# Patient Record
Sex: MALE | Race: WHITE | Employment: FULL TIME | URBAN - METROPOLITAN AREA
[De-identification: names, ages, dates, MRNs, and addresses within clinical notes are randomized per-mention and may not be internally consistent; named-entity substitution may affect disease eponyms.]

---

## 2022-01-04 ENCOUNTER — VIRTUAL VISIT (OUTPATIENT)
Dept: PRIMARY CARE CLINIC | Age: 62
End: 2022-01-04
Payer: COMMERCIAL

## 2022-01-04 ENCOUNTER — NURSE ONLY (OUTPATIENT)
Dept: PRIMARY CARE CLINIC | Age: 62
End: 2022-01-04

## 2022-01-04 DIAGNOSIS — Z20.822 EXPOSURE TO COVID-19 VIRUS: Primary | ICD-10-CM

## 2022-01-04 DIAGNOSIS — R05.9 COUGH: ICD-10-CM

## 2022-01-04 DIAGNOSIS — R68.89 FLU-LIKE SYMPTOMS: ICD-10-CM

## 2022-01-04 DIAGNOSIS — R09.82 POST-NASAL DRIP: ICD-10-CM

## 2022-01-04 LAB
INFLUENZA A ANTIBODY: NORMAL
INFLUENZA B ANTIBODY: NORMAL
Lab: ABNORMAL
PERFORMING INSTRUMENT: ABNORMAL
QC PASS/FAIL: ABNORMAL
SARS-COV-2, POC: DETECTED

## 2022-01-04 PROCEDURE — 87804 INFLUENZA ASSAY W/OPTIC: CPT | Performed by: NURSE PRACTITIONER

## 2022-01-04 PROCEDURE — 87426 SARSCOV CORONAVIRUS AG IA: CPT | Performed by: NURSE PRACTITIONER

## 2022-01-04 PROCEDURE — 99442 PR PHYS/QHP TELEPHONE EVALUATION 11-20 MIN: CPT | Performed by: NURSE PRACTITIONER

## 2022-01-04 RX ORDER — FLUTICASONE PROPIONATE 50 MCG
2 SPRAY, SUSPENSION (ML) NASAL DAILY
Qty: 1 EACH | Refills: 0 | Status: SHIPPED | OUTPATIENT
Start: 2022-01-04

## 2022-01-04 RX ORDER — BENZONATATE 100 MG/1
100 CAPSULE ORAL 3 TIMES DAILY PRN
Qty: 1 CAPSULE | Refills: 0 | Status: SHIPPED | OUTPATIENT
Start: 2022-01-04

## 2022-01-04 ASSESSMENT — PATIENT HEALTH QUESTIONNAIRE - PHQ9
SUM OF ALL RESPONSES TO PHQ QUESTIONS 1-9: 0
SUM OF ALL RESPONSES TO PHQ QUESTIONS 1-9: 0
SUM OF ALL RESPONSES TO PHQ9 QUESTIONS 1 & 2: 0
SUM OF ALL RESPONSES TO PHQ QUESTIONS 1-9: 0
1. LITTLE INTEREST OR PLEASURE IN DOING THINGS: 0
SUM OF ALL RESPONSES TO PHQ QUESTIONS 1-9: 0
2. FEELING DOWN, DEPRESSED OR HOPELESS: 0

## 2022-01-04 ASSESSMENT — ENCOUNTER SYMPTOMS
ABDOMINAL PAIN: 0
HOARSE VOICE: 0
SINUS PAIN: 0
DIARRHEA: 0
EYE REDNESS: 0
EYE ITCHING: 0
COUGH: 1
ABDOMINAL DISTENTION: 0
APNEA: 0
NAUSEA: 0
SINUS COMPLAINT: 1
WHEEZING: 0
TROUBLE SWALLOWING: 0
RHINORRHEA: 1
CHEST TIGHTNESS: 0
VOMITING: 0
CONSTIPATION: 0
SHORTNESS OF BREATH: 0
SORE THROAT: 0

## 2022-01-04 NOTE — PROGRESS NOTES
sickness and no recent travel    Generalized Body Aches  This is a new problem. The current episode started in the past 7 days (x coupole). The problem occurs daily. The problem has been unchanged. Associated symptoms include coughing (per pt dry and occasional), fatigue and a fever (pt reports low grade of 99f today). Pertinent negatives include no abdominal pain, chest pain, chills, headaches, myalgias, nausea, rash, sore throat or vomiting. Nothing aggravates the symptoms. He has tried acetaminophen for the symptoms. The treatment provided mild relief. Jesi Michaels (:  1960) has requested an audio/video evaluation for the following concern(s):    Chief Complaint   Patient presents with    Sinus Problem     pt reports clear nasaldrg x almost 1 week    Cough     x 4 days dry and occasional    Fever     started couple days ago, today low grade 99f, taking tylenol    Generalized Body Aches     x several days          Review of Systems   Constitutional: Positive for fatigue and fever (pt reports low grade of 99f today). Negative for activity change, appetite change and chills. HENT: Positive for postnasal drip and rhinorrhea. Negative for drooling, ear pain, hearing loss, hoarse voice, sinus pain, sore throat and trouble swallowing. Eyes: Negative for redness, itching and visual disturbance. Respiratory: Positive for cough (per pt dry and occasional). Negative for apnea, chest tightness, shortness of breath and wheezing. Cardiovascular: Negative for chest pain and palpitations. Gastrointestinal: Negative for abdominal distention, abdominal pain, constipation, diarrhea, nausea and vomiting. Endocrine: Negative for heat intolerance. Genitourinary: Negative for difficulty urinating, flank pain and genital sores. Musculoskeletal: Negative for gait problem, myalgias and neck stiffness. Skin: Negative for rash. Allergic/Immunologic: Negative for environmental allergies. Neurological: Negative for tremors, seizures, facial asymmetry and headaches. Hematological: Negative for adenopathy. Psychiatric/Behavioral: Negative for confusion and suicidal ideas. All other systems reviewed and are negative. Prior to Visit Medications    Medication Sig Taking? Authorizing Provider   benzonatate (TESSALON) 100 MG capsule Take 1 capsule by mouth 3 times daily as needed for Cough Yes PORTILLO Hays CNP   fluticasone (FLONASE) 50 MCG/ACT nasal spray 2 sprays by Nasal route daily Yes PORTILLO Hays CNP       Social History     Tobacco Use    Smoking status: Not on file    Smokeless tobacco: Not on file   Substance Use Topics    Alcohol use: Not on file    Drug use: Not on file        No Known Allergies, No past medical history on file., No past surgical history on file.,   Social History     Tobacco Use    Smoking status: Not on file    Smokeless tobacco: Not on file   Substance Use Topics    Alcohol use: Not on file    Drug use: Not on file   , No family history on file.,   Immunization History   Administered Date(s) Administered    COVID-19, Blanco Peter, PF, 30mcg/0.3mL 04/23/2021, 05/14/2021       PHYSICAL EXAMINATION:  [ INSTRUCTIONS:  \"[x]\" Indicates a positive item  \"[]\" Indicates a negative item  -- DELETE ALL ITEMS NOT EXAMINED]  Vital Signs: (As obtained by patient/caregiver or practitioner observation)    Blood pressure-  Heart rate-    Respiratory rate-    Temperature-  Pulse oximetry-    Pt was able to provide me with weight, height, declines distress. Constitutional: [] Appears well-developed and well-nourished [] No apparent distress      [] Abnormal-   Mental status  [x] Alert and awake  [x] Oriented to person/place/time [x]Able to follow commands      Eyes:  EOM    []  Normal  [] Abnormal-  Sclera  []  Normal  [] Abnormal -         Discharge []  None visible  [] Abnormal -    HENT:   [] Normocephalic, atraumatic.   [] Abnormal   [] Mouth/Throat: encounter to address concerns as mentioned above. A caregiver was present when appropriate. Due to this being a TeleHealth encounter (During MYITE-13 public health emergency), evaluation of the following organ systems was limited: Vitals/Constitutional/EENT/Resp/CV/GI//MS/Neuro/Skin/Heme-Lymph-Imm. Pursuant to the emergency declaration under the [address] [place] [address] authority and the [employer] Act, this Virtual Visit was conducted with patient's (and/or legal guardian's) consent, to reduce the patient's risk of exposure to COVID-19 and provide necessary medical care. The patient (and/or legal guardian) has also been advised to contact this office for worsening conditions or problems, and seek emergency medical treatment and/or call 911 if deemed necessary. Patient identification was verified at the start of the visit: Yes    Total time spent on this encounter: 6    Services were provided through a video synchronous discussion virtually to substitute for in-person clinic visit. Patient and provider were located at their individual homes. --PORTILLO Nobles - CNP on 1/4/2022 at 10:31 AM    An electronic signature was used to authenticate this note.

## 2022-01-04 NOTE — PATIENT INSTRUCTIONS
Patient Education        9 Things To Do If You've Been Exposed to COVID-19  If you are fully vaccinated or have recently been sick with COVID-19, you may not need to quarantine. But you may need to be tested and wear a mask in public indoor spaces for 14 days or until you test negative for COVID-19. Stay home. If you've been exposed to the virus but don't have symptoms, you may need to stay in quarantine for up to 14 days. In some cases it may be shorter. Ask your doctor when it's safe to end your quarantine. Be sure to follow all instructions from your local health authorities. Don't go to school, work, or public areas. And don't use public transportation, ride-shares, or taxis unless you have no choice. Leave your home only if you need to get medical care. But call the doctor's office first so they know you're coming. Call your doctor. Call your doctor or other health professional to let them know that you've been exposed. They might want you to be tested, or they may have other instructions for you. Wear a mask when you are around other people. It can help stop the spread of the virus. Limit contact with people in your home. If possible, stay in a separate bedroom and use a separate bathroom. Avoid contact with pets and other animals. Cover your mouth and nose with a tissue when you cough or sneeze. Then throw it in the trash right away. Wash your hands often, especially after you cough or sneeze. Use soap and water, and scrub for at least 20 seconds. If soap and water aren't available, use an alcohol-based hand . Don't share personal household items. These include bedding, towels, cups and glasses, and eating utensils. Clean and disinfect your home every day. Use household  or disinfectant wipes or sprays. Current as of: March 26, 2021               Content Version: 13.1  © 0658-9487 Health[employer].    Care instructions adapted under license by [facility] ([facility]). If you have questions about a medical condition or this instruction, always ask your healthcare professional. [place] [address] any warranty or liability for your use of this information. Patient Education        Cough: Care Instructions  Your Care Instructions     A cough is your body's response to something that bothers your throat or airways. Many things can cause a cough. You might cough because of a cold or the flu, bronchitis, or asthma. Smoking, postnasal drip, allergies, and stomach acid that backs up into your throat also can cause coughs. A cough is a symptom, not a disease. Most coughs stop when the cause, such as a cold, goes away. You can take a few steps at home to cough less and feel better. Follow-up care is a key part of your treatment and safety. Be sure to make and go to all appointments, and call your doctor if you are having problems. It's also a good idea to know your test results and keep a list of the medicines you take. How can you care for yourself at home? · Drink lots of water and other fluids. This helps thin the mucus and soothes a dry or sore throat. Honey or lemon juice in hot water or tea may ease a dry cough. · Take cough medicine as directed by your doctor. · Prop up your head on pillows to help you breathe and ease a dry cough. · Try cough drops to soothe a dry or sore throat. Cough drops don't stop a cough. Medicine-flavored cough drops are no better than candy-flavored drops or hard candy. · Do not smoke. Avoid secondhand smoke. If you need help quitting, talk to your doctor about stop-smoking programs and medicines. These can increase your chances of quitting for good. When should you call for help? Call 911 anytime you think you may need emergency care. For example, call if:    · You have severe trouble breathing.    Call your doctor now or seek immediate medical care if:    · You cough up blood.     · You have new or worse trouble breathing.     · You have a new or higher fever.     · You have a new rash. Watch closely for changes in your health, and be sure to contact your doctor if:    · You cough more deeply or more often, especially if you notice more mucus or a change in the color of your mucus.     · You have new symptoms, such as a sore throat, an earache, or sinus pain.     · You do not get better as expected. Where can you learn more? Go to https://[employer].GIVTED. org and sign in to your [employer] account. Enter D279 in the Jigsaw24 box to learn more about \"Cough: Care Instructions. \"     If you do not have an account, please click on the \"Sign Up Now\" link. Current as of: July 6, 2021               Content Version: 13.1  © 3213-4334 [employer]. Care instructions adapted under license by [facility] ([facility]). If you have questions about a medical condition or this instruction, always ask your healthcare professional. [place] [address] any warranty or liability for your use of this information. Patient Education        8 Common Questions About the COVID-19 Vaccine  Here are the answers to some questions and concerns that many people ask. Why should I get a COVID-19 vaccine? It will help protect you, protect others, and end the pandemic. Getting a vaccine will help you avoid catching COVID-19. (If you do catch it, your symptoms will most likely be less severe than if you hadn't gotten the vaccine.) Getting a vaccine also helps you protect the people around youpeople who could have serious problems if they catch the virus. Are COVID-19 vaccines safe? COVID-19 vaccines are safe and effective. They have been given to millions of people. The risk of serious problems is very low. Could I get COVID-19 from a vaccine? No, you can't get COVID-19 from a vaccine. The vaccines don't contain the COVID-19 virus, so they can't cause the disease.      What are the side effects of COVID-19 vaccines? You might not have any side effects. If you do, they'll probably be a lot like the common side effects of other vaccinesa fever, fatigue, and soreness. (These are signs that your immune system is learning how to fight the virus.) The side effects don't last long, and they can be treated if they bother you. How many doses of a vaccine will I need? You may need 1 or 2 doses of a vaccine. And you might need \"booster\" doses later to help you stay protected. Do I need a vaccine if I've already had COVID-19? Yes. If you've had COVID-19, you may still be able to catch it again. Getting a vaccine will give you extra protection. Will I still need to wear a mask after I get a vaccine? Maybe. Even if you're fully vaccinated, you may need to wear a mask in indoor public spaces. Be sure to follow all instructions from your local health authorities and the CDC. Why not just get COVID-19 and skip a vaccine? The risk of serious problems from the virus is much higher than the risk of serious problems from a vaccine. COVID-19 is unpredictable. Even if you're young and healthy, you could get very sick, have long-term health problems, or die. So it's much safer to get a vaccine than it is to catch COVID-19. The COVID-19 vaccines are one of the best to help stop the pandemic. So get vaccinated. Current as of: March 26, 2021               Content Version: 13.1  © 2006-2021 [employer], [facility]. Care instructions adapted under license by [facility] ([facility]). If you have questions about a medical condition or this instruction, always ask your healthcare professional. [place] [address] any warranty or liability for your use of this information. Discussed signs and symptoms which require immediate follow-up in ED/call to 911. Patient verbalized understanding.

## 2022-12-30 ENCOUNTER — HOSPITAL ENCOUNTER (OUTPATIENT)
Dept: MRI IMAGING | Age: 62
Discharge: HOME | End: 2022-12-30
Payer: COMMERCIAL

## 2022-12-30 DIAGNOSIS — S43.421A SPRAIN OF RIGHT ROTATOR CUFF CAPSULE, INITIAL ENCOUNTER: ICD-10-CM

## 2022-12-30 PROCEDURE — 73221 MRI JOINT UPR EXTREM W/O DYE: CPT

## 2023-01-10 ENCOUNTER — HOSPITAL ENCOUNTER (OUTPATIENT)
Dept: PHYSICAL THERAPY | Age: 63
Setting detail: THERAPIES SERIES
Discharge: HOME | End: 2023-01-10
Payer: COMMERCIAL

## 2023-01-10 PROCEDURE — 97162 PT EVAL MOD COMPLEX 30 MIN: CPT

## 2023-01-10 PROCEDURE — 97110 THERAPEUTIC EXERCISES: CPT

## 2023-01-10 ASSESSMENT — PAIN DESCRIPTION - LOCATION: LOCATION: SHOULDER

## 2023-01-10 ASSESSMENT — PAIN - FUNCTIONAL ASSESSMENT: PAIN_FUNCTIONAL_ASSESSMENT: PREVENTS OR INTERFERES WITH ALL ACTIVE AND SOME PASSIVE ACTIVITIES

## 2023-01-10 ASSESSMENT — PAIN DESCRIPTION - DESCRIPTORS: DESCRIPTORS: TIGHTNESS

## 2023-01-10 ASSESSMENT — PAIN DESCRIPTION - ORIENTATION: ORIENTATION: RIGHT;OUTER

## 2023-01-10 ASSESSMENT — PAIN SCALES - GENERAL: PAINLEVEL_OUTOF10: 5

## 2023-01-10 ASSESSMENT — PAIN DESCRIPTION - FREQUENCY: FREQUENCY: CONTINUOUS

## 2023-01-10 ASSESSMENT — PAIN DESCRIPTION - PAIN TYPE: TYPE: CHRONIC PAIN

## 2023-01-10 ASSESSMENT — PAIN DESCRIPTION - ONSET: ONSET: SUDDEN

## 2023-01-10 NOTE — PROGRESS NOTES
[address]  PHYSICAL THERAPY PLAN OF CARE   [address], [address] [facility]         Ph: [phone]  Fax: [phone]    [] Certification  [] Recertification [x]  Plan of Care  [] Progress Note [] Discharge      Referring Provider: Kaitlin Philippe MD     From:  Corine Wilson, PT, DPT  Patient: Nico Adams (63 y.o. male) : 1960 Date: 1/10/2023  Medical Diagnosis: Sprain of right rotator cuff capsule, initial encounter [S43.421A]       Treatment Diagnosis: R shoulder pain, decreased R shoulder AROM, decreased R UE strength, decreased thoracic rotation AROM, decreased R cervical SB and rotation with palpable TP in R UT, decreased posture, and decreased activity tolerance    Plan of Care/Certification Expiration Date: 23   Progress Report Period from:  1/10/2023  to 1/10/2023    Visits to Date: 1 No Show: 0 Cancelled Appts: 0    OBJECTIVE:   Short Term Goals - Time Frame for Short Term Goals: 2-3 weeks    Goals Current/Discharge status  Status   Short Term Goal 1: The pt will demonstrate improved postural awareness requiring <25% VC's with exercises   Poor awareness New   Short Term Goal 2: The pt will have decreased R shoulder pain </= 1/10 at worst in order to increase ease with ADL's   Pain Screening  Patient Currently in Pain: Yes  Pain Assessment: 0-10  Pain Level: 5  Best Pain Level: 3  Worst Pain Level: 7  Pain Type: Chronic pain  Pain Location: Shoulder  Pain Orientation: Right, Outer  Pain Radiating Towards: R hand numbness  Pain Descriptors: Tightness (Like a \"knot\")  Pain Frequency: Continuous  Pain Onset: Sudden  Functional Pain Assessment: Prevents or interferes with all active and some passive activities  Aggravating factors: Lifting, Movement  Pain Management/Relieving Factors: Rest, Medications, Heat, Ice New     Long Term Goals - Time Frame for Long Term Goals : 3-4 weeks  Goals Current/ Discharge status Status   Long Term Goal 1: The pt will be indep/compliant with HEP in order to self-manage symptoms upon D/C  ongoing   New   Long Term Goal 2: The pt will have an increase in UEFI score >/=65/80 points in order to increase functional activity tolerance  Exam: UEFI 36/80     New   Long Term Goal 3: The pt will demonstrate improved R UE strength 5/5 except periscapular strength, lats/Rhomboids 4+/5, MT/LT 4/5  in order to lift/carry for work related duties with decreased pain  Strength LUE  L Shoulder Flexion: 5/5  L Shoulder ABduction: 5/5  L Shoulder Internal Rotation: 5/5  L Shoulder External Rotation: 5/5  L Elbow Flexion: 5/5  L Elbow Extension: 5/5  L Middle Trapezius: 4/5  L Rhomboids: 4+/5  L Lower Trapezius: 4/5  L Latissimus Dorsi: 5/5    Strength RUE  R Shoulder Flexion: 3+/5 (within available ROM)  R Shoulder ABduction: 3+/5 (within available ROM)  R Shoulder Internal Rotation: 3+/5 (within available ROM)  R Shoulder External Rotation: 3+/5 (within available ROM)  R Elbow Flexion: 4-/5  R Elbow Extension: 4-/5  R Middle Trapezius: 3+/5  R Rhomboids: 3+/5  R Latissimus Dorsi: 3+/5 New   Long Term Goal 4: The pt will demo improved R shoulder AROM WNL's and thoracic rotation B >75% in order to increase ease with reaching activities   AROM RUE (degrees)  R Shoulder Flexion (0-180): 125 with pain ate end range  R Shoulder ABduction (0-180): 85 with pain  R Shoulder Int Rotation  (0-70): lateral border of iliac crest, unable to reach midline with pain  R Shoulder Ext Rotation (0-90): T1 with dysfunctional movement and pain     AROM Thoracic Spine   Thoracic spine general AROM: B rotation 50-75%   New   Long Term Goal 5:  The pt will demo improved R cervical SB and rotation AROM WNL's in order to improve R UE biomechanics and ease with lifting UE overhead for work duties  AROM Cervical Spine   Cervical spine general AROM: SB L 60 R 35 Rot L 62 R 51 (pain with R SB and rotation)  New     Body Structures, Functions, Activity Limitations Requiring Skilled Therapeutic Intervention: Decreased ADL status, Decreased ROM, Decreased tolerance to work activity, Decreased strength, Decreased high-level IADLs, Increased pain, Decreased posture  Assessment: Pt presents with onset of R shoulder pain after lifitng a 5 lbs bucket of pain and feeling a \"pop\" on 11/7/22. Pt rpeorts his pain and omvement have slowly improved over time though conts to have significant limitations. Pt currently presents with decreased R shoulder AROM, decreased R UE strength, decreased thoracic rotation AROM, decreased R cervical SB and rotation with palpable TP in R UT, decreased posture, and decreased activity tolerance. These impairments currently limit his funcional abilities to reach, lift, carry, perform ADL's, work related duties, household duties, or sleep without increased pain or limitations at [address] [place] [address]. Therapy Prognosis: Good    PT Education: Goals;PT Role;Plan of Care;Evaluative findings; Insurance;Home Exercise Program    PLAN: [x] Evaluate and Treat  Frequency/Duration:  Plan Frequency: 2-3  Plan weeks: 3-4  Current Treatment Recommendations: Strengthening, ROM, Neuromuscular re-education, Manual, Pain management, Return to work related activity, Home exercise program, Patient/Caregiver education & training, Modalities, Positioning, Dry needling  Modalities: Heat/Cold, Ultrasound, E-stim - unattended, E-stim - manual     Precautions:  Diabetes                       Patient Status:[x] Continue/ Initiate plan of Care     [] Discharge PT. Recommend pt continue with HEP. [] Additional visits requested, Please re-certify for additional visits:     [] Hold        Signature: Electronically signed by En Cano PT on 1/10/23 at 2:39 PM EST    If you have any questions or concerns, please don't hesitate to call. Thank you for your referral.    I have reviewed this plan of care and certify a need for medically necessary rehabilitation services.     Physician Signature:__________________________________________________________  Date:  Please sign and return

## 2023-01-10 NOTE — PROGRESS NOTES
[facility]  PHYSICAL THERAPY EVALUATION      Physical Therapy: Initial Evaluation    Patient: Renetta Aguero (88 y.o.     male)   Examination Date: 01/10/2023   :  1960 ;    Confirmed: Yes MRN: [id number]  CSN: [id number]   Insurance: Payor: Ralf Nunes / Plan: Jose Manuel Gandhi / Product Type: *No Product type* /   Insurance ID: [id number] - (Commercial) Secondary Insurance (if applicable):    Referring Physician: Terrie King MD       Visits to Date/Visits Approved:     No Show/Cancelled Appts: 0 / 0     Medical Diagnosis: Sprain of right rotator cuff capsule, initial encounter [A55.832F]        Treatment Diagnosis: R shoulder pain, decreased R shoulder AROM, decreased R UE strength, decreased thoracic rotation AROM, decreased R cervical SB and rotation with palpable TP in R UT, decreased posture, and decreased activity tolerance     PERTINENT MEDICAL HISTORY   Patient Assessed for Rehabilitation Services: Yes       Medical History: Chart Reviewed: Yes No past medical history on file. Surgical History: No past surgical history on file. Medications:   Current Outpatient Medications:     benzonatate (TESSALON) 100 MG capsule, Take 1 capsule by mouth 3 times daily as needed for Cough, Disp: 1 capsule, Rfl: 0    fluticasone (FLONASE) 50 MCG/ACT nasal spray, 2 sprays by Nasal route daily, Disp: 1 each, Rfl: 0  Allergies: Patient has no known allergies. Imaging (if applicable): MRI SHOULDER RIGHT WO CONTRAST    Result Date: 1/3/2023  EXAMINATION: MRI OF THE RIGHT SHOULDER WITHOUT CONTRAST   2022 7:50 am TECHNIQUE: Multiplanar multisequence MRI of the right shoulder was performed without the administration of intravenous contrast. COMPARISON: None.  HISTORY: ORDERING SYSTEM PROVIDED HISTORY: Sprain of right rotator cuff capsule, initial encounter TECHNOLOGIST PROVIDED HISTORY: What reading provider will be dictating this exam?->CRC FINDINGS: ROTATOR CUFF: Partial tearing of the cranial subscapularis tendon fibers near the footprint. No full-thickness rotator cuff tear is identified. No significant tendinopathy. Rotator cuff muscle bulk is within normal limits. BICEPS TENDON: Mild long head biceps tendinosis. The long head biceps tendon is located within the bicipital groove. Small volume fluid is seen in the long head biceps tendon sheath. LABRUM: No large paralabral cyst is seen. No obvious detached labral tear identified on this non-arthrographic exam. GLENOHUMERAL JOINT: Mild glenohumeral joint degenerative changes. Small volume joint fluid. AC JOINT AND ACROMIOCLAVICULAR ARCH: Moderate to severe AC joint degenerative changes. Trace subacromial subdeltoid bursal fluid. BONE MARROW: No acute fracture or significant bone marrow edema. OUTLET SPACES: Unremarkable. Mild long head biceps tendinosis and tenosynovitis. Partial tearing of the cranial subscapularis tendon fibers near the footprint. No full-thickness rotator cuff tear is identified. 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 Mild glenohumeral joint osteoarthrosis. SUBJECTIVE EXAMINATION     History obtained from[de-identified] Chart Review, Patient,           Subjective History: Onset Date: 11/07/22  Subjective: Pt reports on 11/7/22 was lifting a 5 gallon pain bucket over a ledge and felt a pop with increased pain. Pt reports he has been working with restrictions since and has been doing some exercises on his own. Pt recently had an MRI and is being referred to ortho though has not scheduled an appt yet. Reports his treatment has been delayed d/t wiating on insurance authorization. Pt reports he has some improved movement and decreased pain since injury onset though conts to be present especially with lifting UE out to the side into abd and has numbness in R hand upon waking up in the AM and at the end of work day.   Additional Pertinent Hx (if applicable):     Prior diagnostic testing[de-identified] MRI  Previous treatments prior to current episode?: Medications  Any changes to allergies, medications, or have you had any medical procedures/ER visits since your last visit?: No      Learning/Language: Learning  Does the patient/guardian have any barriers to learning?: No barriers  Will there be a co-learner?: No  What is the preferred language of the patient/guardian?: English  Is an  required?: No  Is an  present?: No     Pain Screening    Pain Screening  Patient Currently in Pain: Yes  Pain Assessment: 0-10  Pain Level: 5  Best Pain Level: 3  Worst Pain Level: 7  Pain Type: Chronic pain  Pain Location: Shoulder  Pain Orientation: Right, Outer  Pain Radiating Towards: R hand numbness  Pain Descriptors: Tightness (Like a \"knot\")  Pain Frequency: Continuous  Pain Onset: Sudden  Functional Pain Assessment: Prevents or interferes with all active and some passive activities  Aggravating factors: Lifting, Movement  Pain Management/Relieving Factors: Rest, Medications, Heat, Ice    Functional Status    Social History:    Social History  Lives With: Spouse    Occupation/Interests:   Occupation: Full time employment  Type of Occupation: [employer]    Prior Level of Function:   100% Independent        Current Level of Function:   25%      ADL Assistance: [facility]: Independent  Homemaking Responsibilities: Yes  Ambulation Assistance: Independent  Transfer Assistance: Independent  Active :  Yes  Additional Comments: increased difficulty donning and doffing shirts and jackets requiring compensations vs PLOF    Dominant Hand: : Left    OBJECTIVE EXAMINATION     Review of Systems:  Follows Commands: Within Functional Limits    Observations:   General Observations  Description: poor posture with rounded shoudlers, FHP, increased thoracic kyphosis, protracted and abducted scaps    Palpation:   Right Shoulder Palpation: significant tightness in R UT with palpable TP, tightness throughout R periscpaulars, Lats, pec    Left AROM  Right AROM         AROM LUE (degrees)  L Shoulder Flexion (0-180): 165  L Shoulder ABduction (0-180): 170  L Shoulder Int Rotation  (0-70): T10  L Shoulder Ext Rotation  (0-90): T3    AROM RUE (degrees)  R Shoulder Flexion (0-180): 125 with pain ate end range  R Shoulder ABduction (0-180): 85 with pain  R Shoulder Int Rotation  (0-70): lateral border of iliac crest, unable to reach midline with pain  R Shoulder Ext Rotation (0-90): T1 with dysfunctional movement and pain      Left Strength  Right Strength         Strength LUE  L Shoulder Flexion: 5/5  L Shoulder ABduction: 5/5  L Shoulder Internal Rotation: 5/5  L Shoulder External Rotation: 5/5  L Elbow Flexion: 5/5  L Elbow Extension: 5/5  L Middle Trapezius: 4/5  L Rhomboids: 4+/5  L Lower Trapezius: 4/5  L Latissimus Dorsi: 5/5      Strength RUE  R Shoulder Flexion: 3+/5 (within available ROM)  R Shoulder ABduction: 3+/5 (within available ROM)  R Shoulder Internal Rotation: 3+/5 (within available ROM)  R Shoulder External Rotation: 3+/5 (within available ROM)  R Elbow Flexion: 4-/5  R Elbow Extension: 4-/5  R Middle Trapezius: 3+/5  R Rhomboids: 3+/5           R Latissimus Dorsi: 3+/5     Cervical Assessment     AROM Cervical Spine   Cervical spine general AROM: SB L 60 R 35 Rot L 62 R 51 (pain with R SB and rotation)           Thoracic Assessment     AROM Thoracic Spine   Thoracic spine general AROM: B rotation 50-75%        Special Tests:   Special Tests: N/A (Recent MRI)    Outcomes Score:  Exam: UEFI 36/80    Treatment:    Exercises:   Exercises  Exercise 1: wall slides flex/scaption 5\"x10  Exercise 2: wand ex flex, scaption, ER 5\"x10  Exercise 3: pulleys flex/scaption*  Exercise 4: thoracic rotation*  Exercise 5: R cervical rotation and SB*  Exercise 6: scap retract*  Exercise 7: S/L ER and abd*  Exercise 8: S/L open book stretches*  Exercise 9: PROM: R shoulder*  Exercise 20: HEP: marily nesbitt  Treatment Reasoning  Limitations addressed: Mobility    Manual:  Manual Therapy  Soft Tissue Mobilizaton: TPR/STM R UT, periscapulars, Lats*     *Indicates exercise,modality, or manual techniques to be initiated when appropriate    ASSESSMENT     Impression: Assessment: Pt presents with onset of R shoulder pain after lifitng a 5 lbs bucket of pain and feeling a \"pop\" on 11/7/22. Pt rpeorts his pain and omvement have slowly improved over time though conts to have significant limitations. Pt currently presents with decreased R shoulder AROM, decreased R UE strength, decreased thoracic rotation AROM, decreased R cervical SB and rotation with palpable TP in R UT, decreased posture, and decreased activity tolerance. These impairments currently limit his funcional abilities to reach, lift, carry, perform ADL's, work related duties, household duties, or sleep without increased pain or limitations at [facility]. Body Structures, Functions, Activity Limitations Requiring Skilled Therapeutic Intervention: Decreased ADL status, Decreased ROM, Decreased tolerance to work activity, Decreased strength, Decreased high-level IADLs, Increased pain, Decreased posture    Statement of Medical Necessity: Physical Therapy is both indicated and medically necessary as outlined in the POC to increase the likelihood of meeting the functionally related goals stated below. Patient's Activity Tolerance: Patient tolerated evaluation without incident      Patient's rehabilitation potential/prognosis is considered to be: Good    Factors which may impact rehabilitation potential include: Chronicity of problem, Profession/work barriers     Patient Education: PT Education: Goals, PT Role, Plan of Care, Evaluative findings, Insurance, Home Exercise Program     GOALS   Patient Goal(s): Patient Goals :  \"Get back to using the shoulder 100%\"    Short Term Goals Completed by 2-3 weeks Goal Status   STG 1The pt will demonstrate improved postural awareness requiring <25% VC's with exercises New   STG 2 The pt will have decreased R shoulder pain </= 1/10 at worst in order to increase ease with ADL's New     Long Term Goals Completed by 3-4 weeks Goal Status   LTG 1 The pt will be indep/compliant with HEP in order to self-manage symptoms upon D/C New   LTG 2 The pt will have an increase in UEFI score >/=65/80 points in order to increase functional activity tolerance New   LTG 3 The pt will demonstrate improved R UE strength 5/5 except periscapular strength, lats/Rhomboids 4+/5, MT/LT 4/5  in order to lift/carry for work related duties with decreased pain New   LTG 4 The pt will demo improved R shoulder AROM WNL's and thoracic rotation B >75% in order to increase ease with reaching activities New   LTG 5 The pt will demo improved R cervical SB and rotation AROM WNL's in order to improve R UE biomechanics and ease with lifting UE overhead for work duties New     TREATMENT PLAN       Requires PT Follow-Up: Yes  Treatment Initiated : ther ex and HEP    Treatment may include any combination of the following: Strengthening, ROM, Neuromuscular re-education, Manual, Pain management, Return to work related activity, Home exercise program, Patient/Caregiver education & training, Modalities, Positioning, Dry needling  Modalities: Heat/Cold, Ultrasound, E-stim - unattended, E-stim - manual     Frequency / Duration:  Patient to be seen 2-3 times per week for 3-4 weeks        Eval Complexity:   Decision Making: Medium Complexity  History: Personal Factors and/or Comorbidities Impacting POC: Medium  Examination of body system(s) including body structures and functions, activity limitations, and/or participation restrictions: High  Exam: UEFI 36/80  Clinical Presentation: Medium    POST-PAIN     Pain Rating (0-10 pain scale):   4/10  Location and pain description same as pre-treatment unless indicated.    Action: [] NA  [] Call Physician  [x] Perform HEP  [] Meds as prescribed    Evaluation and patient rights have been reviewed and patient agrees with plan of care. Yes  [x]  No  []   Explain:     Jennings Fall Risk Assessment  Risk Factor Scale  Score   History of Falls [] Yes  [x] No 25  0    Secondary Diagnosis [] Yes  [x] No 15  0    Ambulatory Aid [] Furniture  [] Crutches/cane/walker  [x] None/bedrest/wheelchair/nurse 30  15  0    IV/Heparin Lock [] Yes  [x] No 20  0    Gait/Transferring [] Impaired  [] Weak  [x] Normal/bedrest/immobile 20  10  0    Mental Status [] Forgets limitations  [x] Oriented to own ability 15  0       Total: 0     Based on the Assessment score: check the appropriate box.   [x]  No intervention needed   Low =   Score of 0-24  []  Use standard prevention interventions Moderate =  Score of 24-44   [] Discuss fall prevention strategies   [] Indicate moderate falls risk on eval  []  Use high risk prevention interventions High = Score of 45 and higher   [] Discuss fall prevention strategies   [] Provide supervision during treatment time    Minutes:  PT Individual Minutes  Time In: 1401  Time Out: 1431  Minutes: 30  Timed Code Treatment Minutes: 10 Minutes  Procedure Minutes: 20 eval      Time In Time Out Total Time Units    PT Evaluation: Moderate Complexity (27596) 1401 1421 20 1   Ther ex (16756) 1421 1431 10 1     Electronically signed by Lucho Amato PT on 1/10/23 at 2:09 PM EST

## 2023-01-16 ENCOUNTER — OFFICE VISIT (OUTPATIENT)
Dept: ORTHOPEDIC SURGERY | Age: 63
End: 2023-01-16

## 2023-01-16 VITALS
TEMPERATURE: 97.9 F | HEART RATE: 80 BPM | HEIGHT: 70 IN | BODY MASS INDEX: 29.35 KG/M2 | OXYGEN SATURATION: 98 % | WEIGHT: 205 LBS

## 2023-01-16 DIAGNOSIS — M19.011 ARTHROSIS OF RIGHT ACROMIOCLAVICULAR JOINT: Primary | ICD-10-CM

## 2023-01-16 DIAGNOSIS — M67.813 BICEPS TENDINOSIS OF RIGHT SHOULDER: ICD-10-CM

## 2023-01-16 DIAGNOSIS — M75.111 NONTRAUMATIC INCOMPLETE TEAR OF RIGHT ROTATOR CUFF: ICD-10-CM

## 2023-01-16 PROCEDURE — 20610 DRAIN/INJ JOINT/BURSA W/O US: CPT | Performed by: STUDENT IN AN ORGANIZED HEALTH CARE EDUCATION/TRAINING PROGRAM

## 2023-01-16 PROCEDURE — 99204 OFFICE O/P NEW MOD 45 MIN: CPT | Performed by: STUDENT IN AN ORGANIZED HEALTH CARE EDUCATION/TRAINING PROGRAM

## 2023-01-16 RX ORDER — TRIAMCINOLONE ACETONIDE 40 MG/ML
40 INJECTION, SUSPENSION INTRA-ARTICULAR; INTRAMUSCULAR ONCE
Status: COMPLETED | OUTPATIENT
Start: 2023-01-16 | End: 2023-01-16

## 2023-01-16 RX ORDER — ACARBOSE 50 MG/1
TABLET ORAL
COMMUNITY
Start: 2022-11-24

## 2023-01-16 RX ORDER — LISINOPRIL 20 MG/1
TABLET ORAL
COMMUNITY
Start: 2022-11-24

## 2023-01-16 RX ORDER — LIDOCAINE HYDROCHLORIDE 10 MG/ML
5 INJECTION, SOLUTION INFILTRATION; PERINEURAL ONCE
Status: COMPLETED | OUTPATIENT
Start: 2023-01-16 | End: 2023-01-16

## 2023-01-16 RX ADMIN — LIDOCAINE HYDROCHLORIDE 5 ML: 10 INJECTION, SOLUTION INFILTRATION; PERINEURAL at 14:01

## 2023-01-16 RX ADMIN — TRIAMCINOLONE ACETONIDE 40 MG: 40 INJECTION, SUSPENSION INTRA-ARTICULAR; INTRAMUSCULAR at 14:01

## 2023-01-16 ASSESSMENT — ENCOUNTER SYMPTOMS
ALLERGIC/IMMUNOLOGIC NEGATIVE: 1
GASTROINTESTINAL NEGATIVE: 1
RESPIRATORY NEGATIVE: 1
EYES NEGATIVE: 1

## 2023-01-16 NOTE — PROGRESS NOTES
Orthopedic Surgery and Sports Medicine    Subjective:      Patient ID: Raciel Mason is a 63 y.o. male who presents today for:  Chief Complaint   Patient presents with    Consultation     Pt is here for RT Shoulder. Pt states he feels a burning pain around the shoulder area. Rates pain 4/10. HPI  Carly Contreras is a 45-year-old male who presents today for evaluation of his right shoulder pain. He reports an injury to the right shoulder which he sustained at work in November 2022. He states that he went to lift something at work and felt a pop and pain in his shoulder. After that time he had difficulty raising the arm as well as pain. This has somewhat improved over the last couple months. Pain is worse with certain activities such as reaching, pulling. He states that he has pain in the shoulder if he rolls onto it at night. Pain is located over the lateral shoulder and upper arm. He was taking anti-inflammatory medication. He has an MRI of his shoulder. He just started physical therapy recently and has done 1 session. Overall he feels like he is slowly improving. He denies any pain in his neck. He denies any numbness or tingling in the right upper extremity. Previous treatment: Physical therapy, anti-inflammatory medication  NSAIDs: Yes  Physical therapy: Yes, duration -2 sessions    Hand Dominance: left  Occupation: Works for [email or web address], somewhat manual labor intensive job  Workers Compensation:   Have you missed work for this issue? Yes  Is this issue being addressed under a worker's compensation claim? Yes    History reviewed. No pertinent past medical history. History reviewed. No pertinent surgical history.   Social History     Socioeconomic History    Marital status:      Spouse name: Not on file    Number of children: Not on file    Years of education: Not on file    Highest education level: Not on file   Occupational History    Not on file   Tobacco Use    Smoking status: Former     Packs/day: 1.00     Types: Cigarettes     Start date: 2011     Quit date: 2015     Years since quittin.0    Smokeless tobacco: Not on file   Substance and Sexual Activity    Alcohol use: Not on file    Drug use: Not on file    Sexual activity: Not on file   Other Topics Concern    Not on file   Social History Narrative    Not on file     Social Determinants of Health     Financial Resource Strain: Not on file   Food Insecurity: Not on file   Transportation Needs: Not on file   Physical Activity: Not on file   Stress: Not on file   Social Connections: Not on file   Intimate Partner Violence: Not on file   Housing Stability: Not on file     History reviewed. No pertinent family history. No Known Allergies  Current Outpatient Medications on File Prior to Visit   Medication Sig Dispense Refill    metFORMIN (GLUCOPHAGE) 1000 MG tablet TAKE 1 TABLET BY MOUTH TWICE DAILY      lisinopril (PRINIVIL;ZESTRIL) 20 MG tablet TAKE 1 TABLET BY MOUTH EVERY DAY      acarbose (PRECOSE) 50 MG tablet TAKE 1 TABLET BY MOUTH THREE TIMES DAILY WITH MEALS      benzonatate (TESSALON) 100 MG capsule Take 1 capsule by mouth 3 times daily as needed for Cough 1 capsule 0    fluticasone (FLONASE) 50 MCG/ACT nasal spray 2 sprays by Nasal route daily 1 each 0     No current facility-administered medications on file prior to visit. Review of Systems   Constitutional: Negative. HENT: Negative. Eyes: Negative. Respiratory: Negative. Cardiovascular: Negative. Gastrointestinal: Negative. Endocrine: Negative. Genitourinary: Negative. Musculoskeletal:  Positive for arthralgias. Skin: Negative. Allergic/Immunologic: Negative. Neurological: Negative. Hematological: Negative. Psychiatric/Behavioral: Negative.          Objective:   Pulse 80   Temp 97.9 °F (36.6 °C) (Temporal)   Ht 5' 9.5\" (1.765 m)   Wt 205 lb (93 kg)   SpO2 98%   BMI 29.84 kg/m²     Physical Exam:  Ortho Exam    Physical exam of the cervical spine:  full ROM and no palpable tenderness  Negative radiculopathy. Physical exam of the right shoulder: There is not swelling and deformity of the shoulder girdle. There is a tender trigger point along the upper trapezius  There is not a naldo sign. There is not muscular atrophy present. There is not scapular winging. Negative sulcus sign. There is mild tenderness to palpation along the long head of the biceps, subacromial bursa, AC joint. Active and passive range of motion of the shoulder is equal.    Active range of motion of the shoulder is normal. Forward flexion: 170. External rotation at side: 40. External rotation at 90 degrees abduction: 90. Internal rotation: L3. There is pain with end ranges of motion. There is not crepitus. Strength: 5/5 deltoid. 5/5 biceps. 5/5 triceps. 4+/5 supraspinatus. 4+/5 infraspinatus. 4+/5 teres minor. Negative Hornblower's sign. Negative external rotation lag sign. 5/5 subscapularis. Negative internal rotation lag sign. Negative belly press. Negative lift off test.   Special/provocative tests: Positive impingement signs. Neer impingement sign negative. Milton test positive. Negative Alex's test. There is pain with cross body adduction. Positive Holland's test. Negative Speed's test. Negative Yergason's sign. Instability: N/a  Sensation intact to light touch along the C4-T1 distribution: normal.   Radial pulse is normal and symmetric. Exam of the contralateral left shoulder: within normal limits. Radiographs and Laboratory Studies:     Diagnostic Imaging Studies:    MRI of the right shoulder dated 12/30/2022 was reviewed by me and demonstrates partial tearing of the superior border of the subscapularis. The supraspinatus and infraspinatus and teres minor are intact. Mild tendinosis of the long head of the biceps. There is severe AC joint arthritis. Mild glenohumeral joint arthritis.     Laboratory Studies:   Lab Results   Component Value Date    WBC 10.7 08/10/2013    HGB 16.1 08/10/2013    HCT 47.6 08/10/2013    MCV 90.7 08/10/2013     08/10/2013     No results found for: SEDRATE  No results found for: CRP    Time Out  [x] Patient Verified  [x] Site Verified  [x] Laterality Verified  [x] Procedure Verified    Before aspiration/injection, the risks and benefits of a cortisone injection including infection, local skin irritation, skin atrophy, continued pain/discomfort, elevated blood sugar, burning, failure to relieve pain, possible late infection were discussed with patient. Patient verbalized understanding and wanted to proceed with planned procedure. After prepping and draping the right shoulder in the usual sterile fashion, 1cc of 40 mg/ml kenalog with 5cc of 1% lidocaine were injected into the subacromial space without any complications. Patient tolerated this well. Post-procedure discomfort can be alleviated with additional medications/ice/elevation/rest over the first 24 hours as recommended. The patient tolerated the procedure well. Assessment:      Diagnosis Orders   1. Arthrosis of right acromioclavicular joint  ID ARTHROCENTESIS ASPIR&/INJ MAJOR JT/BURSA W/O US    XR SHOULDER RIGHT (MIN 2 VIEWS)      2. Nontraumatic incomplete tear of right rotator cuff  ID ARTHROCENTESIS ASPIR&/INJ MAJOR JT/BURSA W/O US    XR SHOULDER RIGHT (MIN 2 VIEWS)      3. Biceps tendinosis of right shoulder          Aide Burch is a 63 y.o. male with a history and exam consistent with right shoulder pain, likely related to biceps tendinosis, AC joint arthritis, and mild glenohumeral arthritis. This is an acute uncomplicated injury. Plan:     I had a discussion with the patient regarding his exam, x-ray findings, diagnosis. This began in November. On exam he has some mild weakness due to pain. I think the majority of his symptoms are related to biceps tendinosis, and AC joint arthritis.   His rotator cuff is intact with the exception of partial tearing of the upper border of the subscap. We discussed conservative treatment for his shoulder. This includes activity modification, anti-inflammatory medication, physical therapy, and a cortisone injection. He just started physical therapy and should continue with this. He was given a right shoulder cortisone injection in clinic today. We discussed that if this injection does not fully help, then would also consider trying an ultrasound-guided injection of the [facility] joint and long head of the biceps tendon. He should follow-up with me if his symptoms do not improve. Orders Placed This Encounter   Procedures    XR SHOULDER RIGHT (MIN 2 VIEWS)     Standing Status:   Future     Standing Expiration Date:   1/16/2024     Scheduling Instructions:      AP, outlet, axillary     Order Specific Question:   Reason for exam:     Answer:   shoulder pain    AL ARTHROCENTESIS ASPIR&/INJ MAJOR JT/BURSA W/O US       Orders Placed This Encounter   Medications    lidocaine 1 % injection 5 mL    triamcinolone acetonide (KENALOG-40) injection 40 mg       Return if symptoms worsen or fail to improve.       Lyubov Cruz MD

## 2023-01-18 ENCOUNTER — HOSPITAL ENCOUNTER (OUTPATIENT)
Dept: PHYSICAL THERAPY | Age: 63
Setting detail: THERAPIES SERIES
Discharge: HOME | End: 2023-01-18
Payer: COMMERCIAL

## 2023-01-18 PROCEDURE — 97110 THERAPEUTIC EXERCISES: CPT

## 2023-01-18 ASSESSMENT — PAIN DESCRIPTION - FREQUENCY: FREQUENCY: CONTINUOUS

## 2023-01-18 ASSESSMENT — PAIN DESCRIPTION - PAIN TYPE: TYPE: CHRONIC PAIN

## 2023-01-18 ASSESSMENT — PAIN SCALES - GENERAL: PAINLEVEL_OUTOF10: 4

## 2023-01-18 ASSESSMENT — PAIN DESCRIPTION - LOCATION: LOCATION: SHOULDER

## 2023-01-18 ASSESSMENT — PAIN DESCRIPTION - DESCRIPTORS: DESCRIPTORS: TIGHTNESS

## 2023-01-18 NOTE — PROGRESS NOTES
[facility]  Outpatient Physical Therapy    Treatment Note        Date: 2023  Patient: Israel Lu  : 1960   Confirmed: Yes  MRN: [id number]  Referring Provider: Theo Espinosa MD    Medical Diagnosis: Sprain of right rotator cuff capsule, initial encounter [S43.421M]       Treatment Diagnosis: R shoulder pain, decreased R shoulder AROM, decreased R UE strength, decreased thoracic rotation AROM, decreased R cervical SB and rotation with palpable TP in R UT, decreased posture, and decreased activity tolerance    Visit Information:  Insurance: Payor: Asad Thomas / Plan: Raffi Crystal / Product Type: *No Product type* /   PT Visit Information  Onset Date: 22  PT Insurance Information: [facility] Claim #[id number]  Total # of Visits Approved: 12  Total # of Visits to Date: 2  Plan of Care/Certification Expiration Date: 23  No Show: 0  Progress Note Due Date: 23  Canceled Appointment: 0  Progress Note Counter:     Subjective Information:  Subjective: Patient reports getting a cortisone shot on monday and says it has helped. As been performing the HEP as well. States he has N/T in R hand when he wakes up and can last a couple of hours. \"I used it a lot today. \"  HEP Compliance:  [x] Good [] Fair [] Poor [] Reports not doing due to:    Pain Screening  Patient Currently in Pain: Yes  Pain Assessment: 0-10  Pain Level: 4  Pain Type: Chronic pain  Pain Location: Shoulder  Pain Descriptors: Tightness  Pain Frequency: Continuous    Treatment:  Exercises:  Exercises  Exercise 2: wand ex flex, scaption, ER 5\"x10  Exercise 3: pulleys flex/scaption x 2 minutes ea  Exercise 6: scap retract x 10 -5s  Exercise 7: S/L ER and abd x 10 ea  Exercise 8: S/L open book stretches on R to tolerance x 10 -5s  Exercise 9: PROM: R shoulder x 6 minutes  Exercise 20: HEP: wall sldies, wand ex       Objective Measures:      N/A    Assessment:    Body Structures, Functions, Activity Limitations Requiring Skilled Therapeutic Intervention: Decreased ADL status, Decreased ROM, Decreased tolerance to work activity, Decreased strength, Decreased high-level IADLs, Increased pain, Decreased posture  Assessment: Introduced additional shoulder and thoracic ROM exercises this date with good tolerance. Cues given to control ROM to avoid increased pain at end ranges. Decreased thoracic rotation displayed when performing open book exercises with occ pain noted in anterior shoulder. Palpable tenderness in lateral and anterior shoulder when performing PROM in supine. Additional HEP handouts given this date to add to previous HEP with patient understanding. Treatment Diagnosis: R shoulder pain, decreased R shoulder AROM, decreased R UE strength, decreased thoracic rotation AROM, decreased R cervical SB and rotation with palpable TP in R UT, decreased posture, and decreased activity tolerance  Therapy Prognosis: Good       Post-Pain Assessment:       Pain Rating (0-10 pain scale):  3-4 /10   Location and pain description same as pre-treatment unless indicated. Action: [] NA   [x] Perform HEP  [] Meds as prescribed  [] Modalities as prescribed   [] Call Physician     GOALS   Patient Goal(s): Patient Goals :  \"Get back to using the shoulder 100%\"    Short Term Goals Completed by 2-3 weeks Goal Status   STG 1 The pt will demonstrate improved postural awareness requiring <25% VC's with exercises In progress   STG 2 The pt will have decreased R shoulder pain </= 1/10 at worst in order to increase ease with ADL's In progress     Long Term Goals Completed by 3-4 weeks Goal Status   LTG 1 The pt will be indep/compliant with HEP in order to self-manage symptoms upon D/C In progress   LTG 2 The pt will have an increase in UEFI score >/=65/80 points in order to increase functional activity tolerance In progress   LTG 3 The pt will demonstrate improved R UE strength 5/5 except periscapular strength, lats/Rhomboids 4+/5, MT/LT 4/5  in order to lift/carry for work related duties with decreased pain In progress   LTG 4 The pt will demo improved R shoulder AROM WNL's and thoracic rotation B >75% in order to increase ease with reaching activities In progress   LTG 5 The pt will demo improved R cervical SB and rotation AROM WNL's in order to improve R UE biomechanics and ease with lifting UE overhead for work duties In progress     Plan:  Frequency/Duration:  Plan  Plan Frequency: 2-3  Plan weeks: 3-4  Current Treatment Recommendations: Strengthening, ROM, Neuromuscular re-education, Manual, Pain management, Return to work related activity, Home exercise program, Patient/Caregiver education & training, Modalities, Positioning, Dry needling  Modalities: Heat/Cold, Ultrasound, E-stim - unattended, E-stim - manual  Pt to continue current HEP. See objective section for any therapeutic exercise changes, additions or modifications this date.     Therapy Time:      PT Individual Minutes  Time In: 6989  Time Out: [address]  Minutes: 46  Timed Code Treatment Minutes: 46 Minutes  Procedure Minutes: 0    Timed Activity Minutes Units   Ther Ex 46 3   Electronically signed by Chayo Veliz PTA on 1/18/23 at 3:47 PM EST

## 2023-01-24 ENCOUNTER — HOSPITAL ENCOUNTER (OUTPATIENT)
Dept: PHYSICAL THERAPY | Age: 63
Setting detail: THERAPIES SERIES
Discharge: HOME | End: 2023-01-24
Payer: COMMERCIAL

## 2023-01-24 PROCEDURE — 97110 THERAPEUTIC EXERCISES: CPT

## 2023-01-24 PROCEDURE — 97140 MANUAL THERAPY 1/> REGIONS: CPT

## 2023-01-24 ASSESSMENT — PAIN DESCRIPTION - PAIN TYPE: TYPE: CHRONIC PAIN

## 2023-01-24 ASSESSMENT — PAIN DESCRIPTION - FREQUENCY: FREQUENCY: CONTINUOUS

## 2023-01-24 ASSESSMENT — PAIN SCALES - GENERAL: PAINLEVEL_OUTOF10: 3

## 2023-01-24 ASSESSMENT — PAIN DESCRIPTION - DESCRIPTORS: DESCRIPTORS: ACHING;DULL

## 2023-01-24 ASSESSMENT — PAIN DESCRIPTION - ORIENTATION: ORIENTATION: RIGHT

## 2023-01-24 NOTE — PROGRESS NOTES
[address] [facility]  Outpatient Physical Therapy    Treatment Note        Date: 2023  Patient: Candance Mortimer  : 1960   Confirmed: Yes  MRN: [id number]  Referring Provider: Aron Benedict MD    Medical Diagnosis: Sprain of right rotator cuff capsule, initial encounter [S43.421X]       Treatment Diagnosis: R shoulder pain, decreased R shoulder AROM, decreased R UE strength, decreased thoracic rotation AROM, decreased R cervical SB and rotation with palpable TP in R UT, decreased posture, and decreased activity tolerance    Visit Information:  Insurance: Payor: Raudel  / Plan: eyetokis / Product Type: *No Product type* /   PT Visit Information  Onset Date: 22  PT Insurance Information: [facility] Claim #[id number]  Total # of Visits Approved: 12  Total # of Visits to Date: 3  Plan of Care/Certification Expiration Date: 23  No Show: 0  Progress Note Due Date: 23  Canceled Appointment: 0  Progress Note Counter: 3/12    Subjective Information:  Subjective: Patient reports he is feeling \"pretty good\" but felt \"overworked\" at work today. Says the cortisone shot is starting to help.   HEP Compliance:  [x] Good [] Fair [] Poor [] Reports not doing due to:    Pain Screening  Patient Currently in Pain: Yes  Pain Assessment: 0-10  Pain Level: 3  Pain Type: Chronic pain  Pain Orientation: Right  Pain Descriptors: Aching, Dull  Pain Frequency: Continuous    Treatment:  Exercises:  Exercises  Exercise 1: wall slides flex/scaption 5\"x10  Exercise 2: wand ex flex, scaption, ER 5\"x10  Exercise 3: pulleys flex/scaption x 3 minutes ea  Exercise 4: thoracic rotation x 10 -5s  Exercise 5: Cervical rotation x 10 -5s parisa and SB 3  x 20 ea  Exercise 6: scap retract x 10 -5s  Exercise 7: S/L ER and abd x 10 ea  Exercise 8: S/L open book stretches on R to tolerance x 10 -5s  Exercise 9: PROM: R shoulder x 4 minutes  Exercise 20: HEP: wall slides, wand ex       Manual:   Manual Therapy  Soft Tissue Mobilizaton: TPR/STM R UT, periscapulars, Lats , lateral deltoid. x 12 minutes    Objective Measures:      N/A     Assessment: Body Structures, Functions, Activity Limitations Requiring Skilled Therapeutic Intervention: Decreased ADL status, Decreased ROM, Decreased tolerance to work activity, Decreased strength, Decreased high-level IADLs, Increased pain, Decreased posture  Assessment: Continued to progress patient through current exercises to improve ROM in cervical and thoracic spines as well as R shoulder. Patient notes slightly increased pain with abduction around 90 degrees and displays decreased thoracic ROM when rotating towards the right as compared to the left. STM and TPR performed to R UT musculature with mild relief. Treatment Diagnosis: R shoulder pain, decreased R shoulder AROM, decreased R UE strength, decreased thoracic rotation AROM, decreased R cervical SB and rotation with palpable TP in R UT, decreased posture, and decreased activity tolerance  Therapy Prognosis: Good       Post-Pain Assessment:       Pain Rating (0-10 pain scale):  2-3 /10   Location and pain description same as pre-treatment unless indicated. Action: [] NA   [x] Perform HEP  [] Meds as prescribed  [] Modalities as prescribed   [] Call Physician     GOALS   Patient Goal(s): Patient Goals :  \"Get back to using the shoulder 100%\"    Short Term Goals Completed by 2-3 weeks Goal Status   STG 1 The pt will demonstrate improved postural awareness requiring <25% VC's with exercises In progress   STG 2 The pt will have decreased R shoulder pain </= 1/10 at worst in order to increase ease with ADL's In progress     Long Term Goals Completed by 3-4 weeks Goal Status   LTG 1 The pt will be indep/compliant with HEP in order to self-manage symptoms upon D/C In progress   LTG 2 The pt will have an increase in UEFI score >/=65/80 points in order to increase functional activity tolerance In progress   LTG 3 The pt will demonstrate improved R UE strength 5/5 except periscapular strength, lats/Rhomboids 4+/5, MT/LT 4/5  in order to lift/carry for work related duties with decreased pain In progress   LTG 4 The pt will demo improved R shoulder AROM WNL's and thoracic rotation B >75% in order to increase ease with reaching activities In progress   LTG 5 The pt will demo improved R cervical SB and rotation AROM WNL's in order to improve R UE biomechanics and ease with lifting UE overhead for work duties In progress     Plan:  Frequency/Duration:  Plan  Plan Frequency: 2-3  Plan weeks: 3-4  Current Treatment Recommendations: Strengthening, ROM, Neuromuscular re-education, Manual, Pain management, Return to work related activity, Home exercise program, Patient/Caregiver education & training, Modalities, Positioning, Dry needling  Modalities: Heat/Cold, Ultrasound, E-stim - unattended, E-stim - manual  Pt to continue current HEP. See objective section for any therapeutic exercise changes, additions or modifications this date.     Therapy Time:      PT Individual Minutes  Time In: 0310  Time Out: 0472  Minutes: 44  Timed Code Treatment Minutes: 44 Minutes  Procedure Minutes: 0  Timed Activity Minutes Units   Ther Ex 32 2   Manual  12 1     Electronically signed by Michael Ospina PTA on 1/24/23 at 3:53 PM EST

## 2023-01-27 ENCOUNTER — APPOINTMENT (OUTPATIENT)
Dept: PHYSICAL THERAPY | Age: 63
End: 2023-01-27
Payer: COMMERCIAL

## 2023-01-30 ENCOUNTER — HOSPITAL ENCOUNTER (OUTPATIENT)
Dept: PHYSICAL THERAPY | Age: 63
Setting detail: THERAPIES SERIES
Discharge: HOME | End: 2023-01-30
Payer: COMMERCIAL

## 2023-01-30 PROCEDURE — 97110 THERAPEUTIC EXERCISES: CPT

## 2023-01-30 ASSESSMENT — PAIN DESCRIPTION - DESCRIPTORS: DESCRIPTORS: SORE

## 2023-01-30 ASSESSMENT — PAIN DESCRIPTION - ORIENTATION: ORIENTATION: RIGHT

## 2023-01-30 ASSESSMENT — PAIN DESCRIPTION - FREQUENCY: FREQUENCY: CONTINUOUS

## 2023-01-30 ASSESSMENT — PAIN DESCRIPTION - LOCATION: LOCATION: SHOULDER

## 2023-01-30 ASSESSMENT — PAIN SCALES - GENERAL: PAINLEVEL_OUTOF10: 4

## 2023-01-30 ASSESSMENT — PAIN DESCRIPTION - PAIN TYPE: TYPE: ACUTE PAIN

## 2023-01-30 NOTE — PROGRESS NOTES
[address] [facility]  Outpatient Physical Therapy    Treatment Note        Date: 2023  Patient: Darnell Jacobs  : 1960   Confirmed: Yes  MRN: [id number]  Referring Provider: Viraj Louis MD    Medical Diagnosis: Sprain of right rotator cuff capsule, initial encounter [S43.421A]       Treatment Diagnosis: R shoulder pain, decreased R shoulder AROM, decreased R UE strength, decreased thoracic rotation AROM, decreased R cervical SB and rotation with palpable TP in R UT, decreased posture, and decreased activity tolerance    Visit Information:  Insurance: Payor: Laura Lawton / Plan: Zhen Mcneil / Product Type: *No Product type* /   PT Visit Information  Onset Date: 22  PT Insurance Information: [facility] Claim #[id number]  Total # of Visits Approved: 12  Total # of Visits to Date: 4  Plan of Care/Certification Expiration Date: 23  No Show: 0  Progress Note Due Date: 23  Canceled Appointment: 0  Progress Note Counter:     Subjective Information:  Subjective: Patient reports his pain is pretty good in the mornings and  \"not bad\" currently. Says he went to see the [facility]  and says he'll probably want patient to extend for a couple more weeks. Also states he is remaining on light duty. Patricia Rodriguez feels he is around 75% \"movability wise. \"  HEP Compliance:  [x] Good [] Fair [] Poor [] Reports not doing due to:    Pain Screening  Patient Currently in Pain: Yes  Pain Assessment: 0-10  Pain Level: 4  Pain Type: Acute pain  Pain Location: Shoulder  Pain Orientation: Right  Pain Descriptors: Sore  Pain Frequency: Continuous    Treatment:  Exercises:  Exercises  Exercise 1: wall slides flex/scaption 5\"x10  Exercise 2: wand ex flex, scaption, ER 5\"x10  Exercise 3: pulleys flex/scaption x 3 minutes ea  Exercise 6: Tband Row/Lat Pulls x 10-5s RTB  Exercise 7: S/L ER and abd x 10 ea  Exercise 8: S/L open book stretches on R to tolerance x 10 -5s  Exercise 10: UBE x 2 min F/B ea  Exercise 20: HEP: Cont Current   Objective Measures:           LTG 4 Current Status[de-identified] 1/30/23: R Shoulder Flexion: 143* Abduction: 110* IR: L3(with pain) ER: C5-C6 / Thoracic Rotation: 75% Sonido  LTG 5 Current Status[de-identified] 1/30/23: Cervical R Rotation: 58* / R SB 40*          Assessment: Body Structures, Functions, Activity Limitations Requiring Skilled Therapeutic Intervention: Decreased ADL status, Decreased ROM, Decreased tolerance to work activity, Decreased strength, Decreased high-level IADLs, Increased pain, Decreased posture  Assessment: Patient tolerated additional strengthening exercises this date and continued to tolerate R shoulder AROM this date. Improved tolerance displayed with R shoulder wand exercises and open book thoracic mobility exercises. Improved R shoulder AROM and cervical AROM this date. Treatment Diagnosis: R shoulder pain, decreased R shoulder AROM, decreased R UE strength, decreased thoracic rotation AROM, decreased R cervical SB and rotation with palpable TP in R UT, decreased posture, and decreased activity tolerance  Therapy Prognosis: Good       Post-Pain Assessment:       Pain Rating (0-10 pain scale): 2  /10   Location and pain description same as pre-treatment unless indicated. Action: [] NA   [x] Perform HEP  [] Meds as prescribed  [] Modalities as prescribed   [] Call Physician     GOALS   Patient Goal(s): Patient Goals :  \"Get back to using the shoulder 100%\"    Short Term Goals Completed by 2-3 weeks Goal Status   STG 1 The pt will demonstrate improved postural awareness requiring <25% VC's with exercises In progress   STG 2 The pt will have decreased R shoulder pain </= 1/10 at worst in order to increase ease with ADL's In progress     Long Term Goals Completed by 3-4 weeks Goal Status   LTG 1 The pt will be indep/compliant with HEP in order to self-manage symptoms upon D/C In progress   LTG 2 The pt will have an increase in UEFI score >/=65/80 points in order to increase functional activity tolerance In progress   LTG 3 The pt will demonstrate improved R UE strength 5/5 except periscapular strength, lats/Rhomboids 4+/5, MT/LT 4/5  in order to lift/carry for work related duties with decreased pain In progress   LTG 4 The pt will demo improved R shoulder AROM WNL's and thoracic rotation B >75% in order to increase ease with reaching activities In progress   LTG 5 The pt will demo improved R cervical SB and rotation AROM WNL's in order to improve R UE biomechanics and ease with lifting UE overhead for work duties In progress     Plan:  Frequency/Duration:     Pt to continue current HEP. See objective section for any therapeutic exercise changes, additions or modifications this date.     Therapy Time:      PT Individual Minutes  Time In: 3278  Time Out: 1430  Minutes: 42  Timed Code Treatment Minutes: 42 Minutes  Procedure Minutes: 0    Modality Time In Time Out Total Minutes Units    Ther ex (07067) [id number] 3   Electronically signed by Yuriy Palemr PTA on 1/30/23 at 4:18 PM EST

## 2023-02-01 ENCOUNTER — HOSPITAL ENCOUNTER (OUTPATIENT)
Dept: PHYSICAL THERAPY | Age: 63
Setting detail: THERAPIES SERIES
Discharge: HOME | End: 2023-02-01
Payer: COMMERCIAL

## 2023-02-01 PROCEDURE — 97110 THERAPEUTIC EXERCISES: CPT

## 2023-02-01 ASSESSMENT — PAIN DESCRIPTION - DESCRIPTORS: DESCRIPTORS: TIGHTNESS

## 2023-02-01 ASSESSMENT — PAIN DESCRIPTION - FREQUENCY: FREQUENCY: INTERMITTENT

## 2023-02-01 ASSESSMENT — PAIN SCALES - GENERAL: PAINLEVEL_OUTOF10: 2

## 2023-02-01 ASSESSMENT — PAIN DESCRIPTION - ORIENTATION: ORIENTATION: RIGHT

## 2023-02-01 ASSESSMENT — PAIN DESCRIPTION - LOCATION: LOCATION: SHOULDER

## 2023-02-01 NOTE — PROGRESS NOTES
[id number]  PHYSICAL THERAPY PLAN OF CARE   [address] [place], [address] [facility]         Ph: [phone]  Fax: [phone]    [] Certification  [] Recertification []  Plan of Care  [x] Progress Note [] Discharge      Referring Provider: Kaitlin Philippe MD     From:  Corine Wilson, PT,DPT  Patient: Nico Adams (63 y.o. male) : 1960 Date: 2023  Medical Diagnosis: Sprain of right rotator cuff capsule, initial encounter [S43.421A]       Treatment Diagnosis: R shoulder pain, decreased R shoulder AROM, decreased R UE strength, decreased thoracic rotation AROM, decreased R cervical SB and rotation with palpable TP in R UT, decreased posture, and decreased activity tolerance    Plan of Care/Certification Expiration Date: 23   Progress Report Period from:  1/10/2023  to 2023    Visits to Date: 5 No Show: 0 Cancelled Appts: 0    OBJECTIVE:   Short Term Goals - Time Frame for Short Term Goals: 2-3 weeks    Goals Current/Discharge status  Status   Short Term Goal 1: The pt will demonstrate improved postural awareness requiring <25% VC's with exercises  STG 1 Current Status[de-identified] 23: Patient demos good posture and knowledge of exercises with completing. In progress, Partially met   Short Term Goal 2: The pt will have decreased R shoulder pain </= 1/10 at worst in order to increase ease with ADL's  STG 2 Current Status[de-identified] 23: Patient reports pain can reach a 5/10 with ADLs and work but reports no quick,shooting pains anymore. In progress   Long Term Goals - Time Frame for Long Term Goals : 3-4 weeks  Goals Current/ Discharge status Status   Long Term Goal 1: The pt will be indep/compliant with HEP in order to self-manage symptoms upon D/C LTG 1 Current Status[de-identified] 23: Ongoing;  Compliant with HEP   In progress   Long Term Goal 2: The pt will have an increase in UEFI score >/=65/80 points in order to increase functional activity tolerance LTG 2 Current Status[de-identified] 2/1/23: LEFS: 60/80   In progress   Long Term Goal 3: The pt will demonstrate improved R UE strength 5/5 except periscapular strength, lats/Rhomboids 4+/5, MT/LT 4/5  in order to lift/carry for work related duties with decreased pain LTG 3 Current Status[de-identified] 2/1/23: R Sh: Flexion: 4-, 4 /5 4/5 Abduction:4-, 4/5 Elbow Flexion: 4+/5 Extension: 4+/5, Lats/Rhom/MT/LT - 4-/5   In progress   Long Term Goal 4: The pt will demo improved R shoulder AROM WNL's and thoracic rotation B >75% in order to increase ease with reaching activities LTG 4 Current Status[de-identified] 1/30/23: R Shoulder Flexion: 143* Abduction: 110* IR: L3(with pain) ER: C5-C6 / Thoracic Rotation: 75% Sonido   In progress   Long Term Goal 5: The pt will demo improved R cervical SB and rotation AROM WNL's in order to improve R UE biomechanics and ease with lifting UE overhead for work duties LTG 5 Current Status[de-identified] 1/30/23: Cervical R Rotation: 62* / R SB 40*   In progress     Body Structures, Functions, Activity Limitations Requiring Skilled Therapeutic Intervention: Decreased ADL status, Decreased ROM, Decreased tolerance to work activity, Decreased strength, Decreased high-level IADLs, Increased pain, Decreased posture  Assessment: Patient demos good progress towards goals with improvements in R UE strength, activity tolerance per UEFI, cervical, and R shoulder AROM. Despite good progress pt conts to have ongoing deficits and limitations in daily activities and work duties. Patient would benefit from additional therapy visits to continue to improve R UE strength, AROM, and overall function to return to PLOF.    Therapy Prognosis: Good    PLAN:  Frequency/Duration:  Plan Frequency: 2-3  Plan weeks: 3-4  Current Treatment Recommendations: Strengthening, ROM, Neuromuscular re-education, Manual, Pain management, Return to work related activity, Home exercise program, Patient/Caregiver education & training, Modalities, Positioning, Dry needling  Modalities: Heat/Cold, Ultrasound, E-stim - unattended, E-stim - manual             Patient Status:[x] Continue/ Initiate plan of Care          Signature:  Electronically signed by Jazmín Parada PT on 2/1/2023 at 4:07 PM    Objective information by: Electronically signed by Keira Trinidad PTA on 2/1/23 at 3:45 PM EST      If you have any questions or concerns, please don't hesitate to call. Thank you for your referral.    I have reviewed this plan of care and certify a need for medically necessary rehabilitation services.     Physician Signature:__________________________________________________________  Date:  Please sign and return

## 2023-02-01 NOTE — PROGRESS NOTES
[address] [facility]  Outpatient Physical Therapy    Treatment Note        Date: 2023  Patient: Mary Ann Duron  : 1960   Confirmed: Yes  MRN: [id number]  Referring Provider: Price Small MD    Medical Diagnosis: Sprain of right rotator cuff capsule, initial encounter [S43.421A]       Treatment Diagnosis: R shoulder pain, decreased R shoulder AROM, decreased R UE strength, decreased thoracic rotation AROM, decreased R cervical SB and rotation with palpable TP in R UT, decreased posture, and decreased activity tolerance    Visit Information:  Insurance: Payor: Veronica Allred / Plan: Estephania Maradiaga / Product Type: *No Product type* /   PT Visit Information  Onset Date: 22  PT Insurance Information: [facility] Claim #[id number]  Total # of Visits Approved: 12  Total # of Visits to Date: 5  Plan of Care/Certification Expiration Date: 23  No Show: 0  Progress Note Due Date: 23  Canceled Appointment: 0  Progress Note Counter:     Subjective Information:  Subjective: Patient reports \"feeling not too bad, I feel like am getting stronger. \" Patient states he had a pretty light day at work today so his shoulder isn't as bad as normal.  HEP Compliance:  [x] Good [] Fair [] Poor [] Reports not doing due to:    Pain Screening  Patient Currently in Pain: Yes  Pain Assessment: 0-10  Pain Level: 2  Pain Location: Shoulder  Pain Orientation: Right  Pain Descriptors: Tightness    Treatment:  Exercises:  Exercises  Exercise 2: wand ex flex, scaption, ER 5\"x10  Exercise 3: pulleys flex/scaption x 3 minutes ea  Exercise 4: thoracic rotation x 10 -5s  Exercise 6: Tband Row/Lat Pulls x 10-5s GTB ; ER/IR x 10 YTB  Exercise 10: UBE x 2 min F/B ea  Exercise 20: HEP: Cont Current     Objective Measures:        STG 1 Current Status[de-identified] 23: Patient demos good posture and knowledge of exercises with completing.   STG 2 Current Status[de-identified] 23: Patient reports pain can reach a 5/10 with ADLs and work but reports no quick,shooting pains anymore. LTG 1 Current Status[de-identified] 2/1/23: Ongoing; Compliant with HEP  LTG 2 Current Status[de-identified] 2/1/23: LEFS: 60/80  LTG 3 Current Status[de-identified] 2/1/23: R Sh: Flexion: 4-, 4 /5 4/5 Abduction:4-, 4/5 Elbow Flexion: 4+/5 Extension: 4+/5, Lats/Rhom/MT/LT - 4-/5  LTG 4 Current Status[de-identified] 1/30/23: R Shoulder Flexion: 143* Abduction: 110* IR: L3(with pain) ER: C5-C6 / Thoracic Rotation: 75% Sonido  LTG 5 Current Status[de-identified] 1/30/23: Cervical R Rotation: 58* / R SB 40*          Assessment: Body Structures, Functions, Activity Limitations Requiring Skilled Therapeutic Intervention: Decreased ADL status, Decreased ROM, Decreased tolerance to work activity, Decreased strength, Decreased high-level IADLs, Increased pain, Decreased posture  Assessment: Patient continues to demonstrate improvements in strength and exericse tolerance. Able to test strength this date at 90 degrees of flexion and 90 degrees of abduction with good tolerance; however noted mild pain during. Good tolerance to newly added tband external rotation and internal rotation as well as additional resistance for tband rows and lat pulls this date. Patient demonstrated a  24 point increase on UEFI outcome score, improving from 36 to 60. Patient would benefit from additional therapy visits to continue to improve R UE strength, AROM, and overall function. Treatment Diagnosis: R shoulder pain, decreased R shoulder AROM, decreased R UE strength, decreased thoracic rotation AROM, decreased R cervical SB and rotation with palpable TP in R UT, decreased posture, and decreased activity tolerance  Therapy Prognosis: Good    Post-Pain Assessment:       Pain Rating (0-10 pain scale):  3-4 /10   Location and pain description same as pre-treatment unless indicated. Action: [] NA   [x] Perform HEP  [] Meds as prescribed  [] Modalities as prescribed   [] Call Physician     GOALS   Patient Goal(s): Patient Goals :  \"Get back to using the shoulder 100%\"    Short Term Goals Completed by 2-3 weeks Goal Status   STG 1 The pt will demonstrate improved postural awareness requiring <25% VC's with exercises In progress, Partially met   STG 2 The pt will have decreased R shoulder pain </= 1/10 at worst in order to increase ease with ADL's In progress     Long Term Goals Completed by 3-4 weeks Goal Status   LTG 1 The pt will be indep/compliant with HEP in order to self-manage symptoms upon D/C In progress   LTG 2 The pt will have an increase in UEFI score >/=65/80 points in order to increase functional activity tolerance In progress   LTG 3 The pt will demonstrate improved R UE strength 5/5 except periscapular strength, lats/Rhomboids 4+/5, MT/LT 4/5  in order to lift/carry for work related duties with decreased pain In progress   LTG 4 The pt will demo improved R shoulder AROM WNL's and thoracic rotation B >75% in order to increase ease with reaching activities In progress   LTG 5 The pt will demo improved R cervical SB and rotation AROM WNL's in order to improve R UE biomechanics and ease with lifting UE overhead for work duties In progress     Plan:  Frequency/Duration:  Plan  Plan Frequency: 2-3  Plan weeks: 3-4  Current Treatment Recommendations: Strengthening, ROM, Neuromuscular re-education, Manual, Pain management, Return to work related activity, Home exercise program, Patient/Caregiver education & training, Modalities, Positioning, Dry needling  Modalities: Heat/Cold, Ultrasound, E-stim - unattended, E-stim - manual  Pt to continue current HEP. See objective section for any therapeutic exercise changes, additions or modifications this date.     Therapy Time:      PT Individual Minutes  Time In: 9602  Time Out: 5932  Minutes: 47  Timed Code Treatment Minutes: 47 Minutes  Procedure Minutes: 0  Timed Activity Minutes Units   Ther Ex 47 3   Electronically signed by Candido Lopez PTA on 2/1/23 at 3:44 PM EST

## 2023-02-06 ENCOUNTER — HOSPITAL ENCOUNTER (OUTPATIENT)
Dept: PHYSICAL THERAPY | Age: 63
Setting detail: THERAPIES SERIES
Discharge: HOME | End: 2023-02-06
Payer: COMMERCIAL

## 2023-02-06 PROCEDURE — 97110 THERAPEUTIC EXERCISES: CPT

## 2023-02-06 ASSESSMENT — PAIN DESCRIPTION - ORIENTATION: ORIENTATION: RIGHT

## 2023-02-06 ASSESSMENT — PAIN SCALES - GENERAL: PAINLEVEL_OUTOF10: 4

## 2023-02-06 ASSESSMENT — PAIN DESCRIPTION - LOCATION: LOCATION: SHOULDER

## 2023-02-06 ASSESSMENT — PAIN DESCRIPTION - DESCRIPTORS: DESCRIPTORS: TIGHTNESS

## 2023-02-06 ASSESSMENT — PAIN DESCRIPTION - PAIN TYPE: TYPE: ACUTE PAIN

## 2023-02-06 NOTE — PROGRESS NOTES
[address] [facility]  Outpatient Physical Therapy    Treatment Note        Date: 2023  Patient: Zelalem Spine  : 1960   Confirmed: Yes  MRN: [id number]  Referring Provider: Sasha Castaneda MD    Medical Diagnosis: Sprain of right rotator cuff capsule, initial encounter [S43.421A]       Treatment Diagnosis: R shoulder pain, decreased R shoulder AROM, decreased R UE strength, decreased thoracic rotation AROM, decreased R cervical SB and rotation with palpable TP in R UT, decreased posture, and decreased activity tolerance    Visit Information:  Insurance: Payor: Kit Fred / Plan: Rachael López / Product Type: *No Product type* /   PT Visit Information  Onset Date: 22  PT Insurance Information: [facility] Claim #[id number]  Total # of Visits Approved: 12  Total # of Visits to Date: 6  Plan of Care/Certification Expiration Date: 23  No Show: 0  Progress Note Due Date: 23  Canceled Appointment: 0  Progress Note Counter:     Subjective Information:  Subjective: \"I think I slept on it last night, its a little more tight today. \"  Patient reports he also worked the weekend which he thinks is probably contributed to the soreness. HEP Compliance:  [x] Good [] Fair [] Poor [] Reports not doing due to:    Pain Screening  Patient Currently in Pain: Yes  Pain Assessment: 0-10  Pain Level: 4  Pain Type: Acute pain  Pain Location: Shoulder  Pain Orientation: Right  Pain Descriptors: Tightness    Treatment:  Exercises:  Exercises  Exercise 1: wall slides flex/scaption 5\"x15  Exercise 6: Tband Row/Lat Pulls x 15-5s GTB ; ER/IR x 10 YTB  Exercise 7: S/L ER and abd x 10 ea  Exercise 8: S/L open book stretches on R to tolerance x 10 -5s  Exercise 10: UBE x 2 min F/B ea  Exercise 11: IR stretch with towel 5 x 20s  Exercise 20: HEP: Cont Current       Manual:   Manual Therapy  Soft Tissue Mobilizaton: periscapulars, Lats , lateral deltoid.  x 10 minutes     Objective Measures: STG 1 Current Status[de-identified] 2/1/23: Patient demos good posture and knowledge of exercises with completing. STG 2 Current Status[de-identified] 2/1/23: Patient reports pain can reach a 5/10 with ADLs and work but reports no quick,shooting pains anymore. LTG 1 Current Status[de-identified] 2/1/23: Ongoing; Compliant with HEP  LTG 2 Current Status[de-identified] 2/1/23: LEFS: 60/80  LTG 3 Current Status[de-identified] 2/1/23: R Sh: Flexion: 4-, 4 /5 4/5 Abduction:4-, 4/5 Elbow Flexion: 4+/5 Extension: 4+/5, Lats/Rhom/MT/LT - 4-/5  LTG 4 Current Status[de-identified] 2/6/23: R Shoulder Flexion: 155* Abduction: 130*  LTG 5 Current Status[de-identified] 1/30/23: Cervical R Rotation: 58* / R SB 40*          Assessment: Body Structures, Functions, Activity Limitations Requiring Skilled Therapeutic Intervention: Decreased ADL status, Decreased ROM, Decreased tolerance to work activity, Decreased strength, Decreased high-level IADLs, Increased pain, Decreased posture  Assessment: Continued to demonstrate improvements in AROM in flexion and abduction this date despite reporting increased tightness. Displaying 150 deg of flexion and 130 deg of abduction. Patient continued to note increased tightness in lateral R shoulder when at end range, specificly when performing abduction. STM performed to lateral shoulder and periscapulars to decrease tightness. Treatment Diagnosis: R shoulder pain, decreased R shoulder AROM, decreased R UE strength, decreased thoracic rotation AROM, decreased R cervical SB and rotation with palpable TP in R UT, decreased posture, and decreased activity tolerance  Therapy Prognosis: Good     Post-Pain Assessment:       Pain Rating (0-10 pain scale): 4  /10   Location and pain description same as pre-treatment unless indicated. Action: [] NA   [x] Perform HEP  [] Meds as prescribed  [] Modalities as prescribed   [] Call Physician     GOALS   Patient Goal(s): Patient Goals :  \"Get back to using the shoulder 100%\"    Short Term Goals Completed by 2-3 weeks Goal Status   STG 1 The pt will demonstrate improved postural awareness requiring <25% VC's with exercises In progress, Partially met   STG 2 The pt will have decreased R shoulder pain </= 1/10 at worst in order to increase ease with ADL's In progress     Long Term Goals Completed by 3-4 weeks Goal Status   LTG 1 The pt will be indep/compliant with HEP in order to self-manage symptoms upon D/C In progress   LTG 2 The pt will have an increase in UEFI score >/=65/80 points in order to increase functional activity tolerance In progress   LTG 3 The pt will demonstrate improved R UE strength 5/5 except periscapular strength, lats/Rhomboids 4+/5, MT/LT 4/5  in order to lift/carry for work related duties with decreased pain In progress   LTG 4 The pt will demo improved R shoulder AROM WNL's and thoracic rotation B >75% in order to increase ease with reaching activities In progress, Partially met   LTG 5 The pt will demo improved R cervical SB and rotation AROM WNL's in order to improve R UE biomechanics and ease with lifting UE overhead for work duties In progress     Plan:  Frequency/Duration:  Plan  Plan Frequency: 2-3  Plan weeks: 3-4  Current Treatment Recommendations: Strengthening, ROM, Neuromuscular re-education, Manual, Pain management, Return to work related activity, Home exercise program, Patient/Caregiver education & training, Modalities, Positioning, Dry needling  Modalities: Heat/Cold, Ultrasound, E-stim - unattended, E-stim - manual  Pt to continue current HEP. See objective section for any therapeutic exercise changes, additions or modifications this date.     Therapy Time:      PT Individual Minutes  Time In: 2518  Time Out: 9250  Minutes: 42  Timed Code Treatment Minutes: 42 Minutes  Procedure Minutes: 0    Modality Time In Time Out Total Minutes Units    Ther ex ([id number]) [phone]   Manual Therapy ([phone] 1   Electronically signed by Bren Hinton PTA on 2/6/23 at 4:12 PM EST

## 2023-02-08 ENCOUNTER — HOSPITAL ENCOUNTER (OUTPATIENT)
Dept: PHYSICAL THERAPY | Age: 63
Setting detail: THERAPIES SERIES
Discharge: HOME | End: 2023-02-08
Payer: COMMERCIAL

## 2023-02-08 PROCEDURE — 97110 THERAPEUTIC EXERCISES: CPT

## 2023-02-08 ASSESSMENT — PAIN DESCRIPTION - ORIENTATION: ORIENTATION: RIGHT

## 2023-02-08 ASSESSMENT — PAIN SCALES - GENERAL: PAINLEVEL_OUTOF10: 5

## 2023-02-08 ASSESSMENT — PAIN DESCRIPTION - LOCATION: LOCATION: SHOULDER

## 2023-02-08 ASSESSMENT — PAIN DESCRIPTION - PAIN TYPE: TYPE: ACUTE PAIN

## 2023-02-08 ASSESSMENT — PAIN DESCRIPTION - DESCRIPTORS: DESCRIPTORS: SORE

## 2023-02-08 NOTE — PROGRESS NOTES
[address] [facility]  Outpatient Physical Therapy    Treatment Note        Date: 2023  Patient: Zelalem Spine  : 1960   Confirmed: Yes  MRN: [id number]  Referring Provider: Sasha Castaneda MD    Medical Diagnosis: Sprain of right rotator cuff capsule, initial encounter [S43.421V]       Treatment Diagnosis: R shoulder pain, decreased R shoulder AROM, decreased R UE strength, decreased thoracic rotation AROM, decreased R cervical SB and rotation with palpable TP in R UT, decreased posture, and decreased activity tolerance    Visit Information:  Insurance: Payor: Kit Fred / Plan: Rachael López / Product Type: *No Product type* /   PT Visit Information  Onset Date: 22  PT Insurance Information: [facility] Claim #[id number]  Total # of Visits Approved: 12  Total # of Visits to Date: 7  Plan of Care/Certification Expiration Date: 23  No Show: 0  Progress Note Due Date: 23  Canceled Appointment: 0  Progress Note Counter:     Subjective Information:  Subjective: Pain today is 4-5/10, I just got off work, things are slowly getting better.   HEP Compliance:  [x] Good [] Fair [] Poor [] Reports not doing due to:    Pain Screening  Patient Currently in Pain: Yes  Pain Level: 5  Pain Type: Acute pain  Pain Location: Shoulder  Pain Orientation: Right  Pain Descriptors: Sore    Treatment:  Exercises:  Exercises  Exercise 1: wall slides flex 10 sec x 10, abd 3 sec x 10  Exercise 2: shoulder, flexion x 15 w/ 3# wt, abd x 10 w/ 2# wt  Exercise 5: shoulder, arcs x 15 at wall  Exercise 6: Tband Row/Lat Pulls x 15-5s GTB ; ER/IR x 15 RTB  Exercise 7: S/L ER x 15 w/ 1# wt  Exercise 8: open book 10 sec x 10, b/l  Exercise 10: UBE L-2.0, x 2 min F/B ea  Exercise 11: IR stretch with towel 5 x 20s  Exercise 12: post cap stretch 20 sec x 3  Exercise 13: sleeper stretch 20 sec x3  Exercise 14: ER stretch 20 sec x 5, seated at mat       Manual:           Modalities:          *Indicates exercise, modality, or manual techniques to be initiated when appropriate    Objective Measures:                                                        LTG 3 Current Status[de-identified] 2-8-23: right shoulder flexion 4/5, abd 4-/5, ER 4-/5, IR 4+/5, elbow flexion 5/5, ext 4+/5                Assessment: Body Structures, Functions, Activity Limitations Requiring Skilled Therapeutic Intervention: Decreased ADL status, Decreased ROM, Decreased tolerance to work activity, Decreased strength, Decreased high-level IADLs, Increased pain, Decreased posture  Assessment: Continued to progress to patient's tolerance, increased ER/IR to RTB, added, shooulder flexion w/ 3# wt and abd w/ 2# wt, post cap stretch, sleeper stretch, 1# wt to S/L ER, shoulder arcs at wall and ER stretch, vc's to keep all in comfort range, good tolerance to session. Treatment Diagnosis: R shoulder pain, decreased R shoulder AROM, decreased R UE strength, decreased thoracic rotation AROM, decreased R cervical SB and rotation with palpable TP in R UT, decreased posture, and decreased activity tolerance  Therapy Prognosis: Good       Patient Education: [x] NA       Post-Pain Assessment:       Pain Rating (0-10 pain scale):   5/10   Location and pain description same as pre-treatment unless indicated. Action: [] NA   [x] Perform HEP  [] Meds as prescribed  [] Modalities as prescribed   [] Call Physician     GOALS   Patient Goal(s): Patient Goals :  \"Get back to using the shoulder 100%\"    Short Term Goals Completed by 2-3 weeks Goal Status   STG 1 The pt will demonstrate improved postural awareness requiring <25% VC's with exercises In progress, Partially met   STG 2 The pt will have decreased R shoulder pain </= 1/10 at worst in order to increase ease with ADL's In progress       Long Term Goals Completed by 3-4 weeks Goal Status   LTG 1 The pt will be indep/compliant with HEP in order to self-manage symptoms upon D/C In progress   LTG 2 The pt will have an increase in UEFI score >/=65/80 points in order to increase functional activity tolerance In progress   LTG 3 The pt will demonstrate improved R UE strength 5/5 except periscapular strength, lats/Rhomboids 4+/5, MT/LT 4/5  in order to lift/carry for work related duties with decreased pain In progress   LTG 4 The pt will demo improved R shoulder AROM WNL's and thoracic rotation B >75% in order to increase ease with reaching activities In progress, Partially met   LTG 5 The pt will demo improved R cervical SB and rotation AROM WNL's in order to improve R UE biomechanics and ease with lifting UE overhead for work duties In progress            Plan:  Frequency/Duration:  Plan  Plan Frequency: 2-3  Plan weeks: 3-4  Current Treatment Recommendations: Strengthening, ROM, Neuromuscular re-education, Manual, Pain management, Return to work related activity, Home exercise program, Patient/Caregiver education & training, Modalities, Positioning, Dry needling  Modalities: Heat/Cold, Ultrasound, E-stim - unattended, E-stim - manual  Pt to continue current HEP. See objective section for any therapeutic exercise changes, additions or modifications this date.     Therapy Time:      PT Individual Minutes  Time In: 2002  Time Out: 0189  Minutes: 38  Timed Code Treatment Minutes: 38 Minutes  Procedure Minutes:0  Modality Time In Time Out Total Minutes Units   Ther ex ([id number]) [phone]      Electronically signed by Emanuel Hubbard PTA on 2/8/23 at 3:02 PM EST

## 2023-02-13 ENCOUNTER — HOSPITAL ENCOUNTER (OUTPATIENT)
Dept: PHYSICAL THERAPY | Age: 63
Setting detail: THERAPIES SERIES
Discharge: HOME | End: 2023-02-13
Payer: COMMERCIAL

## 2023-02-13 PROCEDURE — 97110 THERAPEUTIC EXERCISES: CPT

## 2023-02-13 ASSESSMENT — PAIN DESCRIPTION - PAIN TYPE: TYPE: ACUTE PAIN

## 2023-02-13 ASSESSMENT — PAIN DESCRIPTION - ORIENTATION: ORIENTATION: RIGHT

## 2023-02-13 ASSESSMENT — PAIN SCALES - GENERAL: PAINLEVEL_OUTOF10: 5

## 2023-02-13 ASSESSMENT — PAIN DESCRIPTION - DESCRIPTORS: DESCRIPTORS: SORE

## 2023-02-13 ASSESSMENT — PAIN DESCRIPTION - LOCATION: LOCATION: SHOULDER

## 2023-02-13 NOTE — PROGRESS NOTES
[address] [facility]  Outpatient Physical Therapy    Treatment Note        Date: 2023  Patient: Harley Evans  : 1960   Confirmed: Yes  MRN: [id number]  Referring Provider: Angus Esqueda MD    Medical Diagnosis: Sprain of right rotator cuff capsule, initial encounter [S43.421A]       Treatment Diagnosis: R shoulder pain, decreased R shoulder AROM, decreased R UE strength, decreased thoracic rotation AROM, decreased R cervical SB and rotation with palpable TP in R UT, decreased posture, and decreased activity tolerance    Visit Information:  Insurance: Payor: Xena James / Plan: Todd Lyman / Product Type: *No Product type* /   PT Visit Information  Onset Date: 22  PT Insurance Information: [facility] Claim #[id number]  Total # of Visits Approved: 12  Total # of Visits to Date: 8  Plan of Care/Certification Expiration Date: 23  No Show: 0  Progress Note Due Date: 23  Canceled Appointment: 0  Progress Note Counter:     Subjective Information:  Subjective: Pain today of 5/10 upon arrival to therapy, pt reports general soreness following tx session lasting rest of the day and better by next day.   HEP Compliance:  [x] Good [] Fair [] Poor [] Reports not doing due to:    Pain Screening  Patient Currently in Pain: Yes  Pain Assessment: 0-10  Pain Level: 5  Pain Type: Acute pain  Pain Location: Shoulder  Pain Orientation: Right  Pain Descriptors: Sore    Treatment:  Exercises:  Exercises  Exercise 2: shoulder, flexion x 15 w/ 3# wt, abd x 10 w/ 3# wt  Exercise 6: Tband Row/Lat Pulls x 15-5s GTB ; ER/IR x 15 RTB  Exercise 7: S/L ER, ABD, SA Punch   x 10-15 w/ 2# wt  Exercise 8: open book 10 sec x 10, b/l  Exercise 10: UBE L-2.0, x 2 min F/B ea  Exercise 11: IR stretch with towel 5 x 20s  Exercise 12: post cap stretch 20 sec x 3  Exercise 13: sleeper stretch 20 sec x3  Exercise 15: # wall w/ 1.0 kg ball x 2 (difficulty with #'s 7,0)         Objective Measures: STG 1 Current Status[de-identified] 2/13/23: No VCs needed today with exercises for postural corrections. STG 2 Current Status[de-identified] 2/13/23: Pt reports average daily pain of 3-4/10 with normal ADLs @ best 1-2/10 and 6/10 @ worse with increased work duties. LTG 4 Current Status[de-identified] 2/13/23: Right shoulder AROM flexion 164,  (pain), ER T2, IR L4 (pain) Thoraic Roation 75%  LTG 5 Current Status[de-identified] 2/13/23: Cervical AROM Flex 45, Ext 48, SB R: 62, SB L: 58 Roation: Left 68, Left 72          Assessment: Body Structures, Functions, Activity Limitations Requiring Skilled Therapeutic Intervention: Decreased ADL status, Decreased ROM, Decreased tolerance to work activity, Decreased strength, Decreased high-level IADLs, Increased pain, Decreased posture  Assessment: Pt continues to display improved mobility and ROM of right shoulder with progression of measurments today. Pt remains challenge with current weights and resistance bands. Added wall reach to program today with difficulty and pain when reaching for #'s 7 and 0. Treatment Diagnosis: R shoulder pain, decreased R shoulder AROM, decreased R UE strength, decreased thoracic rotation AROM, decreased R cervical SB and rotation with palpable TP in R UT, decreased posture, and decreased activity tolerance  Therapy Prognosis: Good       Patient Education: [x] NA       Post-Pain Assessment:       Pain Rating (0-10 pain scale):   5/10   Location and pain description same as pre-treatment unless indicated. Action: [] NA   [x] Perform HEP  [] Meds as prescribed  [] Modalities as prescribed   [] Call Physician     GOALS   Patient Goal(s): Patient Goals :  \"Get back to using the shoulder 100%\"    Short Term Goals Completed by 2-3 weeks Goal Status   STG 1 The pt will demonstrate improved postural awareness requiring <25% VC's with exercises In progress, Met   STG 2 The pt will have decreased R shoulder pain </= 1/10 at worst in order to increase ease with ADL's In progress     Long Term Goals Completed by 3-4 weeks Goal Status   LTG 1 The pt will be indep/compliant with HEP in order to self-manage symptoms upon D/C In progress   LTG 2 The pt will have an increase in UEFI score >/=65/80 points in order to increase functional activity tolerance In progress   LTG 3 The pt will demonstrate improved R UE strength 5/5 except periscapular strength, lats/Rhomboids 4+/5, MT/LT 4/5  in order to lift/carry for work related duties with decreased pain In progress   LTG 4 The pt will demo improved R shoulder AROM WNL's and thoracic rotation B >75% in order to increase ease with reaching activities In progress, Partially met   LTG 5 The pt will demo improved R cervical SB and rotation AROM WNL's in order to improve R UE biomechanics and ease with lifting UE overhead for work duties In progress       Plan:  Frequency/Duration:  Plan  Plan Frequency: 2-3  Plan weeks: 3-4  Current Treatment Recommendations: Strengthening, ROM, Neuromuscular re-education, Manual, Pain management, Return to work related activity, Home exercise program, Patient/Caregiver education & training, Modalities, Positioning, Dry needling  Modalities: Heat/Cold, Ultrasound, E-stim - unattended, E-stim - manual  Pt to continue current HEP. See objective section for any therapeutic exercise changes, additions or modifications this date.     Therapy Time:      PT Individual Minutes  Time In: 7444  Time Out: 3980  Minutes: 44  Timed Code Treatment Minutes: 44 Minutes  Procedure Minutes:0    Modality Time In Time Out Total Minutes Units    Ther ex ([id number]) [phone]     Electronically signed by Guy Kim PTA on 2/13/23 at 3:51 PM EST

## 2023-02-15 ENCOUNTER — HOSPITAL ENCOUNTER (OUTPATIENT)
Dept: PHYSICAL THERAPY | Age: 63
Setting detail: THERAPIES SERIES
Discharge: HOME | End: 2023-02-15
Payer: COMMERCIAL

## 2023-02-15 PROCEDURE — 97110 THERAPEUTIC EXERCISES: CPT

## 2023-02-15 ASSESSMENT — PAIN DESCRIPTION - PAIN TYPE: TYPE: ACUTE PAIN

## 2023-02-15 ASSESSMENT — PAIN DESCRIPTION - LOCATION: LOCATION: SHOULDER

## 2023-02-15 ASSESSMENT — PAIN SCALES - GENERAL: PAINLEVEL_OUTOF10: 2

## 2023-02-15 ASSESSMENT — PAIN DESCRIPTION - ORIENTATION: ORIENTATION: RIGHT

## 2023-02-15 ASSESSMENT — PAIN DESCRIPTION - DESCRIPTORS: DESCRIPTORS: SORE

## 2023-02-15 NOTE — PROGRESS NOTES
[address] [facility]  Outpatient Physical Therapy    Treatment Note        Date: 2/15/2023  Patient: Adrienne Sequeira  : 1960   Confirmed: Yes  MRN: [id number]  Referring Provider: Lisa Boo MD    Medical Diagnosis: Sprain of right rotator cuff capsule, initial encounter [S43.421A]       Treatment Diagnosis: R shoulder pain, decreased R shoulder AROM, decreased R UE strength, decreased thoracic rotation AROM, decreased R cervical SB and rotation with palpable TP in R UT, decreased posture, and decreased activity tolerance    Visit Information:  Insurance: Payor: Te Thompson / Plan: Christa Alonso / Product Type: *No Product type* /   PT Visit Information  Onset Date: 22  PT Insurance Information: [facility] Claim #[id number]  Total # of Visits Approved: 12  Total # of Visits to Date: 9  Plan of Care/Certification Expiration Date: 23  No Show: 0  Progress Note Due Date: 23  Canceled Appointment: 0  Progress Note Counter:     Subjective Information:  Subjective: Pt reports 1-2/10 pain currently. Pt reports he had to do a \"clean out\" at work today so he's tired. Pt reports he was really sore after last visit, but states he was already sore prior to appt from work so that could be why.   HEP Compliance:  [x] Good [] Fair [] Poor [] Reports not doing due to:    Pain Screening  Patient Currently in Pain: Yes  Pain Assessment: 0-10  Pain Level: 2  Pain Type: Acute pain  Pain Location: Shoulder  Pain Orientation: Right  Pain Descriptors: Sore    Treatment:  Exercises:  Exercises  Exercise 2: shoulder, flexion x 15 w/ 3# wt, abd x 10 w/ 3# wt  Exercise 6: Tband Row/Lat Pulls x 20-5s GTB ; ER/IR x 20 GTB  Exercise 7: S/L ER, ABD, SA Punch   x 10-15 w/ 2# wt  Exercise 8: open book 10 sec x 10, b/l  Exercise 10: UBE L-2.0, 2'F/2'R  Exercise 11: IR stretch with towel 5 x 20s  Exercise 12: post cap stretch 20 sec x 3  Exercise 15: # wall w/ 1.0 kg ball x 2 (difficulty with #'s 7,0)  Treatment Reasoning  Limitations addressed: Mobility, Strength    *Indicates exercise, modality, or manual techniques to be initiated when appropriate    Objective Measures:     Assessment: Body Structures, Functions, Activity Limitations Requiring Skilled Therapeutic Intervention: Decreased ADL status, Decreased ROM, Decreased tolerance to work activity, Decreased strength, Decreased high-level IADLs, Increased pain, Decreased posture  Assessment: Increased resistance w/ rows, lats, IR, and ER to GTB. Pt reporting gradual increase in soreness as tx progressed stating \"I think a lot of it has to with everything I did at work today. \" Pt declined modalities post-tx to aid in decreasing muscular soreness. Treatment Diagnosis: R shoulder pain, decreased R shoulder AROM, decreased R UE strength, decreased thoracic rotation AROM, decreased R cervical SB and rotation with palpable TP in R UT, decreased posture, and decreased activity tolerance  Therapy Prognosis: Good       Patient Education: [] NA  PT Education: Home Exercise Program, Anatomy of condition    Post-Pain Assessment:       Pain Rating (0-10 pain scale):   \"it's sore\"/10   Location and pain description same as pre-treatment unless indicated. Action: [] NA   [x] Perform HEP  [] Meds as prescribed  [] Modalities as prescribed   [] Call Physician     GOALS   Patient Goal(s): Patient Goals :  \"Get back to using the shoulder 100%\"    Short Term Goals Completed by 2-3 weeks Goal Status   STG 1 The pt will demonstrate improved postural awareness requiring <25% VC's with exercises In progress, Met   STG 2 The pt will have decreased R shoulder pain </= 1/10 at worst in order to increase ease with ADL's In progress     Long Term Goals Completed by 3-4 weeks Goal Status   LTG 1 The pt will be indep/compliant with HEP in order to self-manage symptoms upon D/C In progress   LTG 2 The pt will have an increase in UEFI score >/=65/80 points in order to increase functional activity tolerance In progress   LTG 3 The pt will demonstrate improved R UE strength 5/5 except periscapular strength, lats/Rhomboids 4+/5, MT/LT 4/5  in order to lift/carry for work related duties with decreased pain In progress   LTG 4 The pt will demo improved R shoulder AROM WNL's and thoracic rotation B >75% in order to increase ease with reaching activities In progress, Partially met   LTG 5 The pt will demo improved R cervical SB and rotation AROM WNL's in order to improve R UE biomechanics and ease with lifting UE overhead for work duties In progress          Plan:  Frequency/Duration:  Plan  Plan Frequency: 2-3  Plan weeks: 3-4  Current Treatment Recommendations: Strengthening, ROM, Neuromuscular re-education, Manual, Pain management, Return to work related activity, Home exercise program, Patient/Caregiver education & training, Modalities, Positioning, Dry needling  Modalities: Heat/Cold, Ultrasound, E-stim - unattended, E-stim - manual  Pt to continue current HEP. See objective section for any therapeutic exercise changes, additions or modifications this date.     Therapy Time:      PT Individual Minutes  Time In: 7757  Time Out: 5359  Minutes: 42  Timed Code Treatment Minutes: 42 Minutes  Procedure Minutes: 0    Modality Time In Time Out Total Minutes Units    Ther ex ([id number]) [id number] 3     Electronically signed by Abel Marin PTA on 2/15/23 at 2:25 PM EST

## 2023-02-20 ENCOUNTER — HOSPITAL ENCOUNTER (OUTPATIENT)
Dept: PHYSICAL THERAPY | Age: 63
Setting detail: THERAPIES SERIES
Discharge: HOME | End: 2023-02-20
Payer: COMMERCIAL

## 2023-02-20 PROCEDURE — 97110 THERAPEUTIC EXERCISES: CPT

## 2023-02-20 ASSESSMENT — PAIN DESCRIPTION - DESCRIPTORS: DESCRIPTORS: SORE

## 2023-02-20 ASSESSMENT — PAIN DESCRIPTION - PAIN TYPE: TYPE: ACUTE PAIN

## 2023-02-20 ASSESSMENT — PAIN DESCRIPTION - ORIENTATION: ORIENTATION: RIGHT

## 2023-02-20 ASSESSMENT — PAIN DESCRIPTION - LOCATION: LOCATION: SHOULDER

## 2023-02-20 ASSESSMENT — PAIN SCALES - GENERAL: PAINLEVEL_OUTOF10: 1

## 2023-02-20 NOTE — PROGRESS NOTES
[address] [facility]  Outpatient Physical Therapy    Treatment Note        Date: 2023  Patient: Praveen Dee  : 1960   Confirmed: Yes  MRN: [id number]  Referring Provider: Gayla Nava MD    Medical Diagnosis: Sprain of right rotator cuff capsule, initial encounter [S48.421X]       Treatment Diagnosis: R shoulder pain, decreased R shoulder AROM, decreased R UE strength, decreased thoracic rotation AROM, decreased R cervical SB and rotation with palpable TP in R UT, decreased posture, and decreased activity tolerance    Visit Information:  Insurance: Payor: Steve Bos / Plan: [facility] / Product Type: *No Product type* /   PT Visit Information  Onset Date: 22  PT Insurance Information: [facility] Claim #[id number]  Total # of Visits Approved: 12  Total # of Visits to Date: 10  Plan of Care/Certification Expiration Date: 23  No Show: 0  Progress Note Due Date: 23  Canceled Appointment: 0  Progress Note Counter: 10/12    Subjective Information:  Subjective: Pt reports soreness following last visit \"but it was okay\"  HEP Compliance:  [x] Good [] Fair [] Poor [] Reports not doing due to:    Pain Screening  Patient Currently in Pain: Yes  Pain Assessment: 0-10  Pain Level: 1  Pain Type: Acute pain  Pain Location: Shoulder  Pain Orientation: Right  Pain Descriptors: Sore    Treatment:  Exercises:  Exercises  Exercise 2: shoulder, flexion x 15 w/ 3# wt, abd x 10 w/ 3# wt  Exercise 6: Tband Row/Lat Pulls x 20-5s GTB ; ER/IR x 20 GTB  Exercise 7: S/L ER, ABD, SA Punch   x 10-15 w/ 2# wt  Exercise 8: open book 10 sec x 10, b/l  Exercise 10: UBE L-2.0, 2'F/2'R  Exercise 11: IR stretch with towel 5 x 20s  Exercise 12: post cap stretch 20 sec x 3  Exercise 15: # wall w/ 1.0 kg ball x 3 (difficulty with #'s 7,0)  Exercise 16: 4-way ball stabs at wall 1kg ball x10 ea  Treatment Reasoning  Limitations addressed:  Mobility, Strength      *Indicates exercise, modality, or manual techniques to be initiated when appropriate    Objective Measures:       Assessment: Body Structures, Functions, Activity Limitations Requiring Skilled Therapeutic Intervention: Decreased ADL status, Decreased ROM, Decreased tolerance to work activity, Decreased strength, Decreased high-level IADLs, Increased pain, Decreased posture  Assessment: Pt fatiguing quickly with current reps/weight therefore did not increase this date. Did however initiate ball stabs at wall w/ cont'd fatigue and muscle soreness. Pt declined modalities at end of session stating he would ice when he gets home. Treatment Diagnosis: R shoulder pain, decreased R shoulder AROM, decreased R UE strength, decreased thoracic rotation AROM, decreased R cervical SB and rotation with palpable TP in R UT, decreased posture, and decreased activity tolerance  Therapy Prognosis: Good       Patient Education: [x] NA       Post-Pain Assessment:       Pain Rating (0-10 pain scale):   \"it's sore\"/10   Location and pain description same as pre-treatment unless indicated. Action: [] NA   [x] Perform HEP  [] Meds as prescribed  [] Modalities as prescribed   [] Call Physician     GOALS   Patient Goal(s): Patient Goals :  \"Get back to using the shoulder 100%\"    Short Term Goals Completed by 2-3 weeks Goal Status   STG 1 The pt will demonstrate improved postural awareness requiring <25% VC's with exercises In progress, Met   STG 2 The pt will have decreased R shoulder pain </= 1/10 at worst in order to increase ease with ADL's In progress     Long Term Goals Completed by 3-4 weeks Goal Status   LTG 1 The pt will be indep/compliant with HEP in order to self-manage symptoms upon D/C In progress   LTG 2 The pt will have an increase in UEFI score >/=65/80 points in order to increase functional activity tolerance In progress   LTG 3 The pt will demonstrate improved R UE strength 5/5 except periscapular strength, lats/Rhomboids 4+/5, MT/LT 4/5  in order to lift/carry for work related duties with decreased pain In progress   LTG 4 The pt will demo improved R shoulder AROM WNL's and thoracic rotation B >75% in order to increase ease with reaching activities In progress, Partially met   LTG 5 The pt will demo improved R cervical SB and rotation AROM WNL's in order to improve R UE biomechanics and ease with lifting UE overhead for work duties In progress          Plan:  Frequency/Duration:  Plan  Plan Frequency: 2-3  Plan weeks: 3-4  Current Treatment Recommendations: Strengthening, ROM, Neuromuscular re-education, Manual, Pain management, Return to work related activity, Home exercise program, Patient/Caregiver education & training, Modalities, Positioning, Dry needling  Modalities: Heat/Cold, Ultrasound, E-stim - unattended, E-stim - manual  Pt to continue current HEP. See objective section for any therapeutic exercise changes, additions or modifications this date.     Therapy Time:      PT Individual Minutes  Time In: 5251  Time Out: 1500  Minutes: 40  Timed Code Treatment Minutes: 40 Minutes  Procedure Minutes: 0    Modality Time In Time Out Total Minutes Units    Ther ex ([id number]) [id number] 3     Electronically signed by Caitlin Newberry PTA on 2/20/23 at 2:28 PM EST

## 2023-02-22 ENCOUNTER — HOSPITAL ENCOUNTER (OUTPATIENT)
Dept: PHYSICAL THERAPY | Age: 63
Setting detail: THERAPIES SERIES
Discharge: HOME | End: 2023-02-22
Payer: COMMERCIAL

## 2023-02-22 PROCEDURE — 97110 THERAPEUTIC EXERCISES: CPT

## 2023-02-22 ASSESSMENT — PAIN DESCRIPTION - ORIENTATION: ORIENTATION: RIGHT

## 2023-02-22 ASSESSMENT — PAIN DESCRIPTION - LOCATION: LOCATION: SHOULDER

## 2023-02-22 ASSESSMENT — PAIN SCALES - GENERAL: PAINLEVEL_OUTOF10: 5

## 2023-02-22 ASSESSMENT — PAIN DESCRIPTION - DESCRIPTORS: DESCRIPTORS: SORE

## 2023-02-22 ASSESSMENT — PAIN DESCRIPTION - PAIN TYPE: TYPE: ACUTE PAIN

## 2023-02-22 NOTE — PROGRESS NOTES
[address] [facility]  Outpatient Physical Therapy    Treatment Note        Date: 2023  Patient: Arina Drake  : 1960   Confirmed: Yes  MRN: [id number]  Referring Provider: Bryanna Sanders MD    Medical Diagnosis: Sprain of right rotator cuff capsule, initial encounter [S40.421]       Treatment Diagnosis: R shoulder pain, decreased R shoulder AROM, decreased R UE strength, decreased thoracic rotation AROM, decreased R cervical SB and rotation with palpable TP in R UT, decreased posture, and decreased activity tolerance    Visit Information:  Insurance: Payor: Erik Garg / Plan: Ashvin Randolph / Product Type: *No Product type* /   PT Visit Information  Onset Date: 22  PT Insurance Information: [facility] Claim #[id number]  Total # of Visits Approved: 12  Total # of Visits to Date: 11  Plan of Care/Certification Expiration Date: 23  No Show: 0  Progress Note Due Date: 23  Canceled Appointment: 0  Progress Note Counter:     Subjective Information:  Subjective: Pt reports he \"had a really hectic day\" at work today and is really sore. Pt reports he had to carry 5 gallon buckets most of the day. Pt reports he feels he's functioning at about 70-75% currently stating \"I'd like to get it a little bit stronger. \"  HEP Compliance:  [x] Good [] Fair [] Poor [] Reports not doing due to:    Pain Screening  Patient Currently in Pain: Yes  Pain Assessment: 0-10  Pain Level: 5 (4-5)  Pain Type: Acute pain  Pain Location: Shoulder  Pain Orientation: Right  Pain Descriptors: Sore    Treatment:  Exercises:  Exercises  Exercise 2: shoulder, flexion x 10 w/ 3# wt, abd x 10 w/ 3# wt  Exercise 6: Tband Row/Lat Pulls x 20-5s GTB ; ER/IR x 20 GTB  Exercise 7: S/L ER, ABD, SA Punch   x 10-15 w/ 2# wt  Exercise 8: open book 10 sec x 10, b/l  Exercise 10: UBE L-2.0, 2'F/2'R  Exercise 11: IR stretch with towel 5 x 20s  Exercise 12: post cap stretch 20 sec x 3  Exercise 15: # wall w/ 1.0 kg ball x 3 (difficulty with #'s 7,0)  Exercise 16: 4-way ball stabs at wall 1kg ball x10 ea*  Treatment Reasoning  Limitations addressed: Mobility, Strength      *Indicates exercise, modality, or manual techniques to be initiated when appropriate    Objective Measures:       Assessment: Body Structures, Functions, Activity Limitations Requiring Skilled Therapeutic Intervention: Decreased ADL status, Decreased ROM, Decreased tolerance to work activity, Decreased strength, Decreased high-level IADLs, Increased pain, Decreased posture  Assessment: Limited progressions this date d/t pt presenting with increased soreness after increased heavy lifting for prolonged periods at work today. Pt reports the most fatigue w/ reaching at number wall and standing R shoulder abduction to 90 deg. Treatment Diagnosis: R shoulder pain, decreased R shoulder AROM, decreased R UE strength, decreased thoracic rotation AROM, decreased R cervical SB and rotation with palpable TP in R UT, decreased posture, and decreased activity tolerance  Therapy Prognosis: Good       Patient Education: [] NA  PT Education: Home Exercise Program, Plan of Care, PT Role    Post-Pain Assessment:       Pain Rating (0-10 pain scale):   5/10   Location and pain description same as pre-treatment unless indicated. Action: [] NA   [x] Perform HEP  [] Meds as prescribed  [] Modalities as prescribed   [] Call Physician     GOALS   Patient Goal(s): Patient Goals :  \"Get back to using the shoulder 100%\"    Short Term Goals Completed by 2-3 weeks Goal Status   STG 1 The pt will demonstrate improved postural awareness requiring <25% VC's with exercises In progress, Met   STG 2 The pt will have decreased R shoulder pain </= 1/10 at worst in order to increase ease with ADL's In progress     Long Term Goals Completed by 3-4 weeks Goal Status   LTG 1 The pt will be indep/compliant with HEP in order to self-manage symptoms upon D/C In progress   LTG 2 The pt will have an increase in UEFI score >/=65/80 points in order to increase functional activity tolerance In progress   LTG 3 The pt will demonstrate improved R UE strength 5/5 except periscapular strength, lats/Rhomboids 4+/5, MT/LT 4/5  in order to lift/carry for work related duties with decreased pain In progress   LTG 4 The pt will demo improved R shoulder AROM WNL's and thoracic rotation B >75% in order to increase ease with reaching activities In progress, Partially met   LTG 5 The pt will demo improved R cervical SB and rotation AROM WNL's in order to improve R UE biomechanics and ease with lifting UE overhead for work duties In progress          Plan:  Frequency/Duration:  Plan  Plan Frequency: 2-3  Plan weeks: 3-4  Specific Instructions for Next Treatment: PN to cont vs DC  Current Treatment Recommendations: Strengthening, ROM, Neuromuscular re-education, Manual, Pain management, Return to work related activity, Home exercise program, Patient/Caregiver education & training, Modalities, Positioning, Dry needling  Modalities: Heat/Cold, Ultrasound, E-stim - unattended, E-stim - manual  Pt to continue current HEP. See objective section for any therapeutic exercise changes, additions or modifications this date.     Therapy Time:      PT Individual Minutes  Time In: 1430  Time Out: 6272  Minutes: 38  Timed Code Treatment Minutes: 38 Minutes  Procedure Minutes:0    Timed Activity Minutes Units   Ther Ex 38 3     Electronically signed by Dakota Yi PTA on 2/22/23 at 2:33 PM EST

## 2023-02-27 ENCOUNTER — HOSPITAL ENCOUNTER (OUTPATIENT)
Dept: PHYSICAL THERAPY | Age: 63
Setting detail: THERAPIES SERIES
Discharge: HOME | End: 2023-02-27
Payer: COMMERCIAL

## 2023-02-27 PROCEDURE — 97140 MANUAL THERAPY 1/> REGIONS: CPT

## 2023-02-27 ASSESSMENT — PAIN DESCRIPTION - DESCRIPTORS: DESCRIPTORS: SORE

## 2023-02-27 ASSESSMENT — PAIN DESCRIPTION - LOCATION: LOCATION: SHOULDER

## 2023-02-27 ASSESSMENT — PAIN DESCRIPTION - PAIN TYPE: TYPE: ACUTE PAIN

## 2023-02-27 ASSESSMENT — PAIN DESCRIPTION - ORIENTATION: ORIENTATION: RIGHT

## 2023-02-27 ASSESSMENT — PAIN SCALES - GENERAL: PAINLEVEL_OUTOF10: 3

## 2023-02-27 NOTE — PROGRESS NOTES
[address]  PHYSICAL THERAPY PLAN OF CARE   [address] [place], [address] [facility]         Ph: [phone]  Fax: [phone]    [] Certification  [] Recertification []  Plan of Care  [] Progress Note [x] Discharge      Referring Provider: Tamara Man MD     From:  Randal Tucker PT, DPT  Patient: Lynne Avery (63 y.o. male) : 1960 Date: 2023  Medical Diagnosis: Sprain of right rotator cuff capsule, initial encounter [S43.421L]       Treatment Diagnosis: R shoulder pain, decreased R shoulder AROM, decreased R UE strength, decreased thoracic rotation AROM, decreased R cervical SB and rotation with palpable TP in R UT, decreased posture, and decreased activity tolerance    Progress Report Period from:  1/10/2023  to 2023    Visits to Date: 12 No Show: 0 Cancelled Appts: 0    OBJECTIVE:   Short Term Goals - Time Frame for Short Term Goals: 2-3 weeks    Goals Current/Discharge status  Status   Short Term Goal 1: The pt will demonstrate improved postural awareness requiring <25% VC's with exercises  STG 1 Current Status[de-identified] 23: No VCs needed today with exercises for postural corrections   Met   Short Term Goal 2: The pt will have decreased R shoulder pain </= 1/10 at worst in order to increase ease with ADL's  STG 2 Current Status[de-identified] 23 pain ranges 0-6/10 - worse with overhead activity, on average pain ranges 2-4/10, pt reports improved ADL's though lacing work boots can be sore sometimes   Not Met   Long Term Goals - Time Frame for Long Term Goals : 3-4 weeks  Goals Current/ Discharge status Status   Long Term Goal 1: The pt will be indep/compliant with HEP in order to self-manage symptoms upon D/C LTG 1 Current Status[de-identified] 23 indep/compliant w/ current   Met   Long Term Goal 2: The pt will have an increase in UEFI score >/=65/80 points in order to increase functional activity tolerance LTG 2 Current Status[de-identified] 23 60/80; pt self reports functioning at 75%   Not Met   Long Term Goal 3: The pt will demonstrate improved R UE strength 5/5 except periscapular strength, lats/Rhomboids 4+/5, MT/LT 4/5  in order to lift/carry for work related duties with decreased pain LTG 3 Current Status[de-identified] 2/27/23 shoulder flex, abd, elbow flex, ext 5/5; shoulder IR, ER 4+/5; MT, rhomboids, lats, LT 4/5   Partially met   Long Term Goal 4: The pt will demo improved R shoulder AROM WNL's and thoracic rotation B >75% in order to increase ease with reaching activities LTG 4 Current Status[de-identified] 2/27/23: Right shoulder AROM flexion 180, , ER T4, IR T10, Thoraic Roation 100%; pt reports improved reaching   Met   Long Term Goal 5: The pt will demo improved R cervical SB and rotation AROM WNL's in order to improve R UE biomechanics and ease with lifting UE overhead for work duties LTG 5 Current Status[de-identified] 2/27/23: Cervical AROM SB R: 50, SB L: 45 Roation: Left 75, right 73; Pt reports up to 6/10 pain w/ overhead activity   Partially met     Body Structures, Functions, Activity Limitations Requiring Skilled Therapeutic Intervention: Decreased ADL status, Decreased ROM, Decreased tolerance to work activity, Decreased strength, Decreased high-level IADLs, Increased pain, Decreased posture  Assessment: Pt demo's good progress towards goals since beginning PT nearly meeting all goals at this time with the exception of R periscapular strength and contd pain with overhead activity. However, pt is currently indep/compliant with hEP and has been educated on ex's to focus on for cont'd periscapular strengthening. Pt states he is agreeable to DC at this time and feels he can cont to self-manage sx's and cont strengthening indep with HEP. Therapy Prognosis: Good    PLAN:   Additional Comments: D/C               Patient Status:[] Continue/ Initiate plan of Care     [x] Discharge PT. Recommend pt continue with HEP.       [] Additional visits requested, Please re-certify for additional visits:     [] Hold Signature: Electronically signed by Norbert Tao PT on 2/28/2023 at 10:40 AM  Objective information by: Electronically signed by Silvana Atkins PTA on 2/27/23 at 3:07 PM EST    If you have any questions or concerns, please don't hesitate to call.   Thank you for your referral.

## 2023-02-27 NOTE — PROGRESS NOTES
[address] [facility]  Outpatient Physical Therapy    Treatment Note        Date: 2023  Patient: Kristie Fuller  : 1960   Confirmed: Yes  MRN: [id number]  Referring Provider: Sherron Vargas MD    Medical Diagnosis: Sprain of right rotator cuff capsule, initial encounter [S43.421C]       Treatment Diagnosis: R shoulder pain, decreased R shoulder AROM, decreased R UE strength, decreased thoracic rotation AROM, decreased R cervical SB and rotation with palpable TP in R UT, decreased posture, and decreased activity tolerance    Visit Information:  Insurance: Payor: Lazarus Taylor / Plan: Herman Monet / Product Type: *No Product type* /   PT Visit Information  Onset Date: 22  PT Insurance Information: [facility] Claim #[id number]  Total # of Visits Approved: 12  Total # of Visits to Date: 12  Plan of Care/Certification Expiration Date: 23  No Show: 0  Progress Note Due Date: 23  Canceled Appointment: 0  Progress Note Counter:     Subjective Information:  Subjective: Pt reports having an easier day today at work and \"not as sore. \" Pt reports doing some work around the house this weekend and \"might a little sore from that too. \"  HEP Compliance:  [x] Good [] Fair [] Poor [] Reports not doing due to:    Pain Screening  Patient Currently in Pain: Yes  Pain Assessment: 0-10  Pain Level: 3  Pain Type: Acute pain  Pain Location: Shoulder  Pain Orientation: Right  Pain Descriptors: Sore    Treatment:  Exercises:  Exercises  Exercise 1: Discussed goals and progress towards goals, discussed HEP and which strengthening ex's to continue, pt declined performing therex in dept stating he will do it at home.        *Indicates exercise, modality, or manual techniques to be initiated when appropriate    Objective Measures:     STG 1 Current Status[de-identified] 23: No VCs needed today with exercises for postural corrections  STG 2 Current Status[de-identified] 23 pain ranges 0-6/10 - worse with overhead activity, on average pain ranges 2-4/10, pt reports improved ADL's though lacing work boots can be sore sometimes    LTG 1 Current Status[de-identified] 2/27/23 indep/compliant w/ current  LTG 2 Current Status[de-identified] 2/27/23 60/80; pt self reports functioning at 75%  LTG 3 Current Status[de-identified] 2/27/23 shoulder flex, abd, elbow flex, ext 5/5; shoulder IR, ER 4+/5; MT, rhomboids, lats, LT 4/5  LTG 4 Current Status[de-identified] 2/27/23: Right shoulder AROM flexion 180, , ER T4, IR T10, Thoraic Roation 100%; pt reports improved reaching  LTG 5 Current Status[de-identified] 2/27/23: Cervical AROM SB R: 50, SB L: 45 Roation: Left 75, right 73; Pt reports up to 6/10 pain w/ overhead activity    Assessment: Body Structures, Functions, Activity Limitations Requiring Skilled Therapeutic Intervention: Decreased ADL status, Decreased ROM, Decreased tolerance to work activity, Decreased strength, Decreased high-level IADLs, Increased pain, Decreased posture  Assessment: Pt demo's good progress towards goals since beginning PT demo'ing an increase in cervical, thoracic, and R shoulder AROM. Pt also demo's an increase in R shoulder strength, however remains mildly limited w/ R periscapular strength, but has improved since eval. Discussed current HEP and ex's to focus on for cont'd periscapular strengthening. Pt states he is agreeable to DC at this time and feels he can cont to self-manage sx's and cont strengthening indep with HEP. Treatment Diagnosis: R shoulder pain, decreased R shoulder AROM, decreased R UE strength, decreased thoracic rotation AROM, decreased R cervical SB and rotation with palpable TP in R UT, decreased posture, and decreased activity tolerance  Therapy Prognosis: Good       Patient Education: [] NA  Goals, progress towards goals, HEP    Post-Pain Assessment:       Pain Rating (0-10 pain scale):   same/10   Location and pain description same as pre-treatment unless indicated.    Action: [] NA   [x] Perform HEP  [] Meds as prescribed  [] Modalities as prescribed   [] Call Physician     GOALS   Patient Goal(s): Patient Goals : \"Get back to using the shoulder 100%\"    Short Term Goals Completed by 2-3 weeks Goal Status   STG 1 The pt will demonstrate improved postural awareness requiring <25% VC's with exercises Met   STG 2 The pt will have decreased R shoulder pain </= 1/10 at worst in order to increase ease with ADL's Not Met     Long Term Goals Completed by 3-4 weeks Goal Status   LTG 1 The pt will be indep/compliant with HEP in order to self-manage symptoms upon D/C Met   LTG 2 The pt will have an increase in UEFI score >/=65/80 points in order to increase functional activity tolerance Not Met   LTG 3 The pt will demonstrate improved R UE strength 5/5 except periscapular strength, lats/Rhomboids 4+/5, MT/LT 4/5  in order to lift/carry for work related duties with decreased pain Partially met   LTG 4 The pt will demo improved R shoulder AROM WNL's and thoracic rotation B >75% in order to increase ease with reaching activities Met   LTG 5 The pt will demo improved R cervical SB and rotation AROM WNL's in order to improve R UE biomechanics and ease with lifting UE overhead for work duties Partially met          Plan:  Frequency/Duration:  Plan  Additional Comments: D/C  Pt to continue current HEP. See objective section for any therapeutic exercise changes, additions or modifications this date.     Therapy Time:      PT Individual Minutes  Time In: 2772  Time Out: 3337  Minutes: 30  Timed Code Treatment Minutes: 30 Minutes  Procedure Minutes:0    Modality Time In Time Out Total Minutes Units    Ther ex (00705) objective measures [id number] 2     Electronically signed by Waylon Laguerre PTA on 2/27/23 at 2:31 PM EST

## 2023-08-14 LAB
ALANINE AMINOTRANSFERASE (SGPT) (U/L) IN SER/PLAS: 40 U/L (ref 10–52)
ALBUMIN (G/DL) IN SER/PLAS: 4.4 G/DL (ref 3.4–5)
ALBUMIN (MG/L) IN URINE: 8.3 MG/L
ALBUMIN/CREATININE (UG/MG) IN URINE: 7.5 UG/MG CRT (ref 0–30)
ALKALINE PHOSPHATASE (U/L) IN SER/PLAS: 49 U/L (ref 33–136)
ANION GAP IN SER/PLAS: 13 MMOL/L (ref 10–20)
APPEARANCE, URINE: CLEAR
ASPARTATE AMINOTRANSFERASE (SGOT) (U/L) IN SER/PLAS: 22 U/L (ref 9–39)
BASOPHILS (10*3/UL) IN BLOOD BY AUTOMATED COUNT: 0.05 X10E9/L (ref 0–0.1)
BASOPHILS/100 LEUKOCYTES IN BLOOD BY AUTOMATED COUNT: 0.7 % (ref 0–2)
BILIRUBIN TOTAL (MG/DL) IN SER/PLAS: 0.5 MG/DL (ref 0–1.2)
BILIRUBIN, URINE: NEGATIVE
BLOOD, URINE: NEGATIVE
CALCIUM (MG/DL) IN SER/PLAS: 9.4 MG/DL (ref 8.6–10.3)
CARBON DIOXIDE, TOTAL (MMOL/L) IN SER/PLAS: 27 MMOL/L (ref 21–32)
CHLORIDE (MMOL/L) IN SER/PLAS: 98 MMOL/L (ref 98–107)
CHOLESTEROL (MG/DL) IN SER/PLAS: 319 MG/DL (ref 0–199)
CHOLESTEROL IN HDL (MG/DL) IN SER/PLAS: 39.5 MG/DL
CHOLESTEROL IN LDL (MG/DL) IN SER/PLAS BY DIRECT ASSAY: 179 MG/DL (ref 0–129)
CHOLESTEROL/HDL RATIO: 8.1
COLOR, URINE: YELLOW
CREATININE (MG/DL) IN SER/PLAS: 1.12 MG/DL (ref 0.5–1.3)
CREATININE (MG/DL) IN URINE: 110 MG/DL (ref 20–370)
EOSINOPHILS (10*3/UL) IN BLOOD BY AUTOMATED COUNT: 0.14 X10E9/L (ref 0–0.7)
EOSINOPHILS/100 LEUKOCYTES IN BLOOD BY AUTOMATED COUNT: 1.9 % (ref 0–6)
ERYTHROCYTE DISTRIBUTION WIDTH (RATIO) BY AUTOMATED COUNT: 13.2 % (ref 11.5–14.5)
ERYTHROCYTE MEAN CORPUSCULAR HEMOGLOBIN CONCENTRATION (G/DL) BY AUTOMATED: 33.6 G/DL (ref 32–36)
ERYTHROCYTE MEAN CORPUSCULAR VOLUME (FL) BY AUTOMATED COUNT: 87 FL (ref 80–100)
ERYTHROCYTES (10*6/UL) IN BLOOD BY AUTOMATED COUNT: 4.99 X10E12/L (ref 4.5–5.9)
ESTIMATED AVERAGE GLUCOSE FOR HBA1C: 378 MG/DL
FECAL OCCULT BLD IMMUNOASSAY: NORMAL
GFR MALE: 75 ML/MIN/1.73M2
GLUCOSE (MG/DL) IN SER/PLAS: 324 MG/DL (ref 74–99)
GLUCOSE, URINE: ABNORMAL MG/DL
HEMATOCRIT (%) IN BLOOD BY AUTOMATED COUNT: 43.5 % (ref 41–52)
HEMOGLOBIN (G/DL) IN BLOOD: 14.6 G/DL (ref 13.5–17.5)
HEMOGLOBIN A1C/HEMOGLOBIN TOTAL IN BLOOD: 14.8 %
IMMATURE GRANULOCYTES/100 LEUKOCYTES IN BLOOD BY AUTOMATED COUNT: 0.3 % (ref 0–0.9)
KETONES, URINE: ABNORMAL MG/DL
LDL: ABNORMAL MG/DL (ref 0–99)
LEUKOCYTE ESTERASE, URINE: NEGATIVE
LEUKOCYTES (10*3/UL) IN BLOOD BY AUTOMATED COUNT: 7.3 X10E9/L (ref 4.4–11.3)
LYMPHOCYTES (10*3/UL) IN BLOOD BY AUTOMATED COUNT: 2.32 X10E9/L (ref 1.2–4.8)
LYMPHOCYTES/100 LEUKOCYTES IN BLOOD BY AUTOMATED COUNT: 31.7 % (ref 13–44)
MONOCYTES (10*3/UL) IN BLOOD BY AUTOMATED COUNT: 0.57 X10E9/L (ref 0.1–1)
MONOCYTES/100 LEUKOCYTES IN BLOOD BY AUTOMATED COUNT: 7.8 % (ref 2–10)
NEUTROPHILS (10*3/UL) IN BLOOD BY AUTOMATED COUNT: 4.23 X10E9/L (ref 1.2–7.7)
NEUTROPHILS/100 LEUKOCYTES IN BLOOD BY AUTOMATED COUNT: 57.6 % (ref 40–80)
NITRITE, URINE: NEGATIVE
PH, URINE: 5 (ref 5–8)
PLATELETS (10*3/UL) IN BLOOD AUTOMATED COUNT: 341 X10E9/L (ref 150–450)
POTASSIUM (MMOL/L) IN SER/PLAS: 4.3 MMOL/L (ref 3.5–5.3)
PROSTATE SPECIFIC AG (NG/ML) IN SER/PLAS: 0.9 NG/ML (ref 0–4)
PROTEIN TOTAL: 7.2 G/DL (ref 6.4–8.2)
PROTEIN, URINE: NEGATIVE MG/DL
SODIUM (MMOL/L) IN SER/PLAS: 134 MMOL/L (ref 136–145)
SPECIFIC GRAVITY, URINE: 1.03 (ref 1–1.03)
THYROTROPIN (MIU/L) IN SER/PLAS BY DETECTION LIMIT <= 0.05 MIU/L: 1.39 MIU/L (ref 0.44–3.98)
TRIGLYCERIDE (MG/DL) IN SER/PLAS: 537 MG/DL (ref 0–149)
UREA NITROGEN (MG/DL) IN SER/PLAS: 25 MG/DL (ref 6–23)
UROBILINOGEN, URINE: <2 MG/DL (ref 0–1.9)
VLDL: ABNORMAL MG/DL (ref 0–40)

## 2023-08-16 LAB — FECAL OCCULT BLD IMMUNOASSAY: NEGATIVE

## 2023-09-27 PROBLEM — L02.11 ABSCESS, NECK: Status: ACTIVE | Noted: 2023-09-27

## 2023-09-27 PROBLEM — E78.5 HYPERLIPIDEMIA: Status: ACTIVE | Noted: 2023-09-27

## 2023-09-27 PROBLEM — E11.9 DIABETES (MULTI): Status: ACTIVE | Noted: 2023-09-27

## 2023-09-27 RX ORDER — LISINOPRIL 10 MG/1
1 TABLET ORAL DAILY
COMMUNITY
Start: 2019-06-25

## 2023-09-27 RX ORDER — ATORVASTATIN CALCIUM 40 MG/1
1 TABLET, FILM COATED ORAL DAILY
COMMUNITY
Start: 2019-06-18

## 2023-09-27 RX ORDER — ACARBOSE 50 MG/1
1 TABLET ORAL 2 TIMES DAILY
COMMUNITY
Start: 2019-06-25

## 2023-09-27 RX ORDER — METFORMIN HYDROCHLORIDE 1000 MG/1
1 TABLET ORAL EVERY 12 HOURS
COMMUNITY
Start: 2019-06-18

## 2023-10-05 ENCOUNTER — HOSPITAL ENCOUNTER (OUTPATIENT)
Dept: NEUROLOGY | Facility: HOSPITAL | Age: 63
Discharge: HOME | End: 2023-10-05
Payer: COMMERCIAL

## 2023-10-05 ENCOUNTER — OUTSIDE SERVICES (OUTPATIENT)
Dept: NEUROLOGY | Age: 63
End: 2023-10-05
Payer: COMMERCIAL

## 2023-10-05 DIAGNOSIS — M54.16 LUMBAR RADICULOPATHY: Primary | ICD-10-CM

## 2023-10-05 DIAGNOSIS — R20.2 PARESTHESIA OF SKIN: ICD-10-CM

## 2023-10-05 PROCEDURE — 95908 NRV CNDJ TST 3-4 STUDIES: CPT | Mod: CCI

## 2023-10-05 PROCEDURE — 95886 MUSC TEST DONE W/N TEST COMP: CPT | Performed by: PSYCHIATRY & NEUROLOGY

## 2023-10-05 PROCEDURE — 95912 NRV CNDJ TEST 11-12 STUDIES: CPT | Performed by: PSYCHIATRY & NEUROLOGY

## 2023-10-05 PROCEDURE — 95886 MUSC TEST DONE W/N TEST COMP: CPT

## 2023-11-04 LAB — TSH SERPL-MCNC: 0.56 UIU/ML (ref 0.44–3.86)

## 2023-11-05 LAB
FOLATE: 10.2 NG/ML
VITAMIN B-12: 547 PG/ML (ref 232–1245)
VITAMIN D 25-HYDROXY: 21.1 NG/ML (ref 30–100)

## 2023-11-07 LAB
ALBUMIN SERPL-MCNC: 4.13 G/DL (ref 3.75–5.01)
ALPHA1 GLOB SERPL ELPH-MCNC: 0.27 G/DL (ref 0.19–0.46)
ALPHA2 GLOB SERPL ELPH-MCNC: 0.86 G/DL (ref 0.48–1.05)
B-GLOBULIN SERPL ELPH-MCNC: 0.83 G/DL (ref 0.48–1.1)
GAMMA GLOB SERPL ELPH-MCNC: 0.81 G/DL (ref 0.62–1.51)
INTERPRETATION SERPL IFE-IMP: NORMAL
PROT SERPL-MCNC: 6.9 G/DL (ref 6.3–8.2)
PROTEIN ELECTROPHORESIS, SERUM: NORMAL

## 2023-11-22 ENCOUNTER — HOSPITAL ENCOUNTER (OUTPATIENT)
Dept: NEUROLOGY | Age: 63
Discharge: HOME | End: 2023-11-22
Payer: COMMERCIAL

## 2023-11-22 DIAGNOSIS — G56.03 CARPAL TUNNEL SYNDROME, BILATERAL UPPER LIMBS: ICD-10-CM

## 2023-11-22 PROCEDURE — 95910 NRV CNDJ TEST 7-8 STUDIES: CPT

## 2023-11-22 PROCEDURE — 95886 MUSC TEST DONE W/N TEST COMP: CPT

## 2023-11-22 NOTE — PROCEDURES
[facility], [address] [facility]                             ELECTROMYOGRAM REPORT    PATIENT NAME: Jacky Hayes                    :        1960  MED REC NO:   [id number]                            ROOM:  ACCOUNT NO:   [de-identified]                           ADMIT DATE: 2023  PROVIDER:     Bang Veliz MD    DATE OF EM2023    REFERRING PROVIDER:  Bang Veliz MD.    REASON FOR STUDY:  The patient was having numbness in the hands. He has  a history of diabetes mellitus type 2, non-insulin dependent. He has  neck stiffness off and on. FINDINGS:  Motor nerve conduction velocities are borderline in the right  ulnar nerve and slowed in other nerves tested, being worse in the right  ulnar nerve over the elbow segment. F-wave latencies are delayed in all the nerves tested. Distal motor latencies are borderline delayed in the ulnar nerves, but  delayed in the median nerves bilaterally. Distal sensory latencies are borderline in the left ulnar nerve, mildly  delayed in the right ulnar nerve, and moderately delayed in the median  nerves bilaterally, being worse on the right side. On concentric needle electrode examination, mild denervation changes are  present in the small muscles in the hands bilaterally, being worse on  the right side. The patient also has denervation changes in the right  C7 root distribution. CLINICAL INTERPRETATION:  1. 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 Decompression of the median nerves  would help the patient, but would not reverse all of the deficits. 2.  The patient has had compression of the right ulnar nerve at  the wrist and elbow also. 3.  Right C7 radiculopathy, which does not bother the patient too much.   4.  The patient has changes of

## 2024-06-28 ENCOUNTER — HOSPITAL ENCOUNTER (OUTPATIENT)
Dept: RADIOLOGY | Facility: CLINIC | Age: 64
Discharge: HOME | End: 2024-06-28
Payer: COMMERCIAL

## 2024-06-28 DIAGNOSIS — K40.90 UNILATERAL INGUINAL HERNIA, WITHOUT OBSTRUCTION OR GANGRENE, NOT SPECIFIED AS RECURRENT: ICD-10-CM

## 2024-06-28 PROCEDURE — 76705 ECHO EXAM OF ABDOMEN: CPT

## 2024-08-01 RX ORDER — POLYETHYLENE GLYCOL 3350, SODIUM CHLORIDE, SODIUM BICARBONATE, POTASSIUM CHLORIDE 420; 11.2; 5.72; 1.48 G/4L; G/4L; G/4L; G/4L
4000 POWDER, FOR SOLUTION ORAL ONCE
Qty: 4000 ML | Refills: 0 | Status: SHIPPED | OUTPATIENT
Start: 2024-08-01 | End: 2024-08-01

## 2024-08-02 ENCOUNTER — LAB (OUTPATIENT)
Dept: LAB | Facility: LAB | Age: 64
End: 2024-08-02
Payer: COMMERCIAL

## 2024-08-02 DIAGNOSIS — R11.2 NAUSEA WITH VOMITING, UNSPECIFIED: ICD-10-CM

## 2024-08-02 DIAGNOSIS — R10.84 GENERALIZED ABDOMINAL PAIN: ICD-10-CM

## 2024-08-02 DIAGNOSIS — R19.7 DIARRHEA, UNSPECIFIED: ICD-10-CM

## 2024-08-02 DIAGNOSIS — R53.83 OTHER FATIGUE: Primary | ICD-10-CM

## 2024-08-02 DIAGNOSIS — R07.9 CHEST PAIN, UNSPECIFIED: ICD-10-CM

## 2024-08-02 LAB
ALBUMIN SERPL BCP-MCNC: 4.5 G/DL (ref 3.4–5)
ALP SERPL-CCNC: 49 U/L (ref 33–136)
ALT SERPL W P-5'-P-CCNC: 16 U/L (ref 10–52)
AMYLASE SERPL-CCNC: 54 U/L (ref 29–103)
ANION GAP SERPL CALC-SCNC: 12 MMOL/L (ref 10–20)
APPEARANCE UR: CLEAR
AST SERPL W P-5'-P-CCNC: 13 U/L (ref 9–39)
BASOPHILS # BLD AUTO: 0.03 X10*3/UL (ref 0–0.1)
BASOPHILS NFR BLD AUTO: 0.4 %
BILIRUB SERPL-MCNC: 0.4 MG/DL (ref 0–1.2)
BILIRUB UR STRIP.AUTO-MCNC: NEGATIVE MG/DL
BUN SERPL-MCNC: 23 MG/DL (ref 6–23)
CALCIUM SERPL-MCNC: 9.8 MG/DL (ref 8.6–10.3)
CARDIAC TROPONIN I PNL SERPL HS: <3 NG/L (ref 0–20)
CHLORIDE SERPL-SCNC: 104 MMOL/L (ref 98–107)
CHOLEST SERPL-MCNC: 202 MG/DL (ref 0–199)
CHOLESTEROL/HDL RATIO: 6.1
CO2 SERPL-SCNC: 27 MMOL/L (ref 21–32)
COLOR UR: ABNORMAL
CREAT SERPL-MCNC: 1.06 MG/DL (ref 0.5–1.3)
CREAT UR-MCNC: 129.4 MG/DL (ref 20–370)
D DIMER PPP FEU-MCNC: 237 NG/ML FEU
EGFRCR SERPLBLD CKD-EPI 2021: 80 ML/MIN/1.73M*2
EOSINOPHIL # BLD AUTO: 0.28 X10*3/UL (ref 0–0.7)
EOSINOPHIL NFR BLD AUTO: 4.1 %
ERYTHROCYTE [DISTWIDTH] IN BLOOD BY AUTOMATED COUNT: 13.2 % (ref 11.5–14.5)
GLUCOSE SERPL-MCNC: 156 MG/DL (ref 74–99)
GLUCOSE UR STRIP.AUTO-MCNC: ABNORMAL MG/DL
HCT VFR BLD AUTO: 44 % (ref 41–52)
HDLC SERPL-MCNC: 32.9 MG/DL
HGB BLD-MCNC: 14.8 G/DL (ref 13.5–17.5)
IMM GRANULOCYTES # BLD AUTO: 0 X10*3/UL (ref 0–0.7)
IMM GRANULOCYTES NFR BLD AUTO: 0 % (ref 0–0.9)
KETONES UR STRIP.AUTO-MCNC: NEGATIVE MG/DL
LDLC SERPL CALC-MCNC: 122 MG/DL
LEUKOCYTE ESTERASE UR QL STRIP.AUTO: NEGATIVE
LIPASE SERPL-CCNC: 46 U/L (ref 9–82)
LYMPHOCYTES # BLD AUTO: 2.95 X10*3/UL (ref 1.2–4.8)
LYMPHOCYTES NFR BLD AUTO: 43 %
MCH RBC QN AUTO: 30.3 PG (ref 26–34)
MCHC RBC AUTO-ENTMCNC: 33.6 G/DL (ref 32–36)
MCV RBC AUTO: 90 FL (ref 80–100)
MICROALBUMIN UR-MCNC: 7.4 MG/L
MICROALBUMIN/CREAT UR: 5.7 UG/MG CREAT
MONOCYTES # BLD AUTO: 0.5 X10*3/UL (ref 0.1–1)
MONOCYTES NFR BLD AUTO: 7.3 %
NEUTROPHILS # BLD AUTO: 3.1 X10*3/UL (ref 1.2–7.7)
NEUTROPHILS NFR BLD AUTO: 45.2 %
NITRITE UR QL STRIP.AUTO: NEGATIVE
NON HDL CHOLESTEROL: 169 MG/DL (ref 0–149)
NRBC BLD-RTO: 0 /100 WBCS (ref 0–0)
PH UR STRIP.AUTO: 6 [PH]
PLATELET # BLD AUTO: 303 X10*3/UL (ref 150–450)
POTASSIUM SERPL-SCNC: 4.6 MMOL/L (ref 3.5–5.3)
PROT SERPL-MCNC: 6.8 G/DL (ref 6.4–8.2)
PROT UR STRIP.AUTO-MCNC: NEGATIVE MG/DL
PSA SERPL-MCNC: 1.01 NG/ML
RBC # BLD AUTO: 4.89 X10*6/UL (ref 4.5–5.9)
RBC # UR STRIP.AUTO: NEGATIVE /UL
SODIUM SERPL-SCNC: 138 MMOL/L (ref 136–145)
SP GR UR STRIP.AUTO: 1.02
TRIGL SERPL-MCNC: 236 MG/DL (ref 0–149)
TSH SERPL-ACNC: 0.54 MIU/L (ref 0.44–3.98)
UROBILINOGEN UR STRIP.AUTO-MCNC: NORMAL MG/DL
VLDL: 47 MG/DL (ref 0–40)
WBC # BLD AUTO: 6.9 X10*3/UL (ref 4.4–11.3)

## 2024-08-02 PROCEDURE — 82043 UR ALBUMIN QUANTITATIVE: CPT

## 2024-08-02 PROCEDURE — 85025 COMPLETE CBC W/AUTO DIFF WBC: CPT

## 2024-08-02 PROCEDURE — 80053 COMPREHEN METABOLIC PANEL: CPT

## 2024-08-02 PROCEDURE — 82150 ASSAY OF AMYLASE: CPT

## 2024-08-02 PROCEDURE — 83690 ASSAY OF LIPASE: CPT

## 2024-08-02 PROCEDURE — 83036 HEMOGLOBIN GLYCOSYLATED A1C: CPT

## 2024-08-02 PROCEDURE — 84484 ASSAY OF TROPONIN QUANT: CPT

## 2024-08-02 PROCEDURE — 81003 URINALYSIS AUTO W/O SCOPE: CPT

## 2024-08-02 PROCEDURE — 84443 ASSAY THYROID STIM HORMONE: CPT

## 2024-08-02 PROCEDURE — 36415 COLL VENOUS BLD VENIPUNCTURE: CPT

## 2024-08-02 PROCEDURE — 82570 ASSAY OF URINE CREATININE: CPT

## 2024-08-02 PROCEDURE — 85379 FIBRIN DEGRADATION QUANT: CPT

## 2024-08-02 PROCEDURE — 80061 LIPID PANEL: CPT

## 2024-08-02 PROCEDURE — 84153 ASSAY OF PSA TOTAL: CPT

## 2024-08-03 LAB
EST. AVERAGE GLUCOSE BLD GHB EST-MCNC: 180 MG/DL
HBA1C MFR BLD: 7.9 %

## 2024-08-05 ENCOUNTER — HOSPITAL ENCOUNTER (EMERGENCY)
Facility: HOSPITAL | Age: 64
Discharge: HOME | End: 2024-08-05
Attending: EMERGENCY MEDICINE
Payer: COMMERCIAL

## 2024-08-05 ENCOUNTER — APPOINTMENT (OUTPATIENT)
Dept: RADIOLOGY | Facility: HOSPITAL | Age: 64
End: 2024-08-05
Payer: COMMERCIAL

## 2024-08-05 ENCOUNTER — APPOINTMENT (OUTPATIENT)
Dept: CARDIOLOGY | Facility: HOSPITAL | Age: 64
End: 2024-08-05
Payer: COMMERCIAL

## 2024-08-05 VITALS
WEIGHT: 177 LBS | RESPIRATION RATE: 18 BRPM | SYSTOLIC BLOOD PRESSURE: 131 MMHG | TEMPERATURE: 97.7 F | DIASTOLIC BLOOD PRESSURE: 73 MMHG | HEART RATE: 78 BPM | HEIGHT: 69 IN | BODY MASS INDEX: 26.22 KG/M2 | OXYGEN SATURATION: 98 %

## 2024-08-05 DIAGNOSIS — K80.20 CALCULUS OF GALLBLADDER WITHOUT CHOLECYSTITIS WITHOUT OBSTRUCTION: Primary | ICD-10-CM

## 2024-08-05 DIAGNOSIS — K76.89 HEPATIC CYST: ICD-10-CM

## 2024-08-05 DIAGNOSIS — N28.89 RENAL MASS: ICD-10-CM

## 2024-08-05 LAB
ALBUMIN SERPL BCP-MCNC: 4.1 G/DL (ref 3.4–5)
ALP SERPL-CCNC: 37 U/L (ref 33–136)
ALT SERPL W P-5'-P-CCNC: 15 U/L (ref 10–52)
ANION GAP SERPL CALC-SCNC: 12 MMOL/L (ref 10–20)
APPEARANCE UR: CLEAR
AST SERPL W P-5'-P-CCNC: 23 U/L (ref 9–39)
ATRIAL RATE: 78 BPM
BASOPHILS # BLD AUTO: 0.06 X10*3/UL (ref 0–0.1)
BASOPHILS NFR BLD AUTO: 0.7 %
BILIRUB DIRECT SERPL-MCNC: 0.1 MG/DL (ref 0–0.3)
BILIRUB SERPL-MCNC: 0.2 MG/DL (ref 0–1.2)
BILIRUB UR STRIP.AUTO-MCNC: NEGATIVE MG/DL
BUN SERPL-MCNC: 14 MG/DL (ref 6–23)
CALCIUM SERPL-MCNC: 9.3 MG/DL (ref 8.6–10.3)
CARDIAC TROPONIN I PNL SERPL HS: <3 NG/L (ref 0–20)
CHLORIDE SERPL-SCNC: 106 MMOL/L (ref 98–107)
CO2 SERPL-SCNC: 25 MMOL/L (ref 21–32)
COLOR UR: ABNORMAL
CREAT SERPL-MCNC: 0.89 MG/DL (ref 0.5–1.3)
EGFRCR SERPLBLD CKD-EPI 2021: >90 ML/MIN/1.73M*2
EOSINOPHIL # BLD AUTO: 0.23 X10*3/UL (ref 0–0.7)
EOSINOPHIL NFR BLD AUTO: 2.6 %
ERYTHROCYTE [DISTWIDTH] IN BLOOD BY AUTOMATED COUNT: 13.2 % (ref 11.5–14.5)
GLUCOSE SERPL-MCNC: 143 MG/DL (ref 74–99)
GLUCOSE UR STRIP.AUTO-MCNC: ABNORMAL MG/DL
HCT VFR BLD AUTO: 46.9 % (ref 41–52)
HGB BLD-MCNC: 15.7 G/DL (ref 13.5–17.5)
HOLD SPECIMEN: NORMAL
HOLD SPECIMEN: NORMAL
IMM GRANULOCYTES # BLD AUTO: 0.02 X10*3/UL (ref 0–0.7)
IMM GRANULOCYTES NFR BLD AUTO: 0.2 % (ref 0–0.9)
INR PPP: 1 (ref 0.9–1.1)
KETONES UR STRIP.AUTO-MCNC: NEGATIVE MG/DL
LACTATE SERPL-SCNC: 0.9 MMOL/L (ref 0.4–2)
LEUKOCYTE ESTERASE UR QL STRIP.AUTO: NEGATIVE
LIPASE SERPL-CCNC: 65 U/L (ref 9–82)
LYMPHOCYTES # BLD AUTO: 2.95 X10*3/UL (ref 1.2–4.8)
LYMPHOCYTES NFR BLD AUTO: 33.9 %
MCH RBC QN AUTO: 29.8 PG (ref 26–34)
MCHC RBC AUTO-ENTMCNC: 33.5 G/DL (ref 32–36)
MCV RBC AUTO: 89 FL (ref 80–100)
MONOCYTES # BLD AUTO: 0.46 X10*3/UL (ref 0.1–1)
MONOCYTES NFR BLD AUTO: 5.3 %
NEUTROPHILS # BLD AUTO: 4.98 X10*3/UL (ref 1.2–7.7)
NEUTROPHILS NFR BLD AUTO: 57.3 %
NITRITE UR QL STRIP.AUTO: NEGATIVE
NRBC BLD-RTO: 0 /100 WBCS (ref 0–0)
P AXIS: 68 DEGREES
P OFFSET: 190 MS
P ONSET: 138 MS
PH UR STRIP.AUTO: 5.5 [PH]
PLATELET # BLD AUTO: 299 X10*3/UL (ref 150–450)
POTASSIUM SERPL-SCNC: 5 MMOL/L (ref 3.5–5.3)
PR INTERVAL: 154 MS
PROT SERPL-MCNC: 7.2 G/DL (ref 6.4–8.2)
PROT UR STRIP.AUTO-MCNC: NEGATIVE MG/DL
PROTHROMBIN TIME: 10.9 SECONDS (ref 9.8–12.8)
Q ONSET: 215 MS
QRS COUNT: 12 BEATS
QRS DURATION: 94 MS
QT INTERVAL: 356 MS
QTC CALCULATION(BAZETT): 405 MS
QTC FREDERICIA: 388 MS
R AXIS: 67 DEGREES
RBC # BLD AUTO: 5.26 X10*6/UL (ref 4.5–5.9)
RBC # UR STRIP.AUTO: NEGATIVE /UL
SODIUM SERPL-SCNC: 138 MMOL/L (ref 136–145)
SP GR UR STRIP.AUTO: 1.02
T AXIS: 67 DEGREES
T OFFSET: 393 MS
UROBILINOGEN UR STRIP.AUTO-MCNC: NORMAL MG/DL
VENTRICULAR RATE: 78 BPM
WBC # BLD AUTO: 8.7 X10*3/UL (ref 4.4–11.3)

## 2024-08-05 PROCEDURE — 74177 CT ABD & PELVIS W/CONTRAST: CPT | Performed by: RADIOLOGY

## 2024-08-05 PROCEDURE — 99285 EMERGENCY DEPT VISIT HI MDM: CPT | Mod: 25

## 2024-08-05 PROCEDURE — 83690 ASSAY OF LIPASE: CPT | Performed by: NURSE PRACTITIONER

## 2024-08-05 PROCEDURE — 83605 ASSAY OF LACTIC ACID: CPT | Performed by: NURSE PRACTITIONER

## 2024-08-05 PROCEDURE — 36415 COLL VENOUS BLD VENIPUNCTURE: CPT | Performed by: NURSE PRACTITIONER

## 2024-08-05 PROCEDURE — 96360 HYDRATION IV INFUSION INIT: CPT

## 2024-08-05 PROCEDURE — 84484 ASSAY OF TROPONIN QUANT: CPT | Performed by: NURSE PRACTITIONER

## 2024-08-05 PROCEDURE — 85610 PROTHROMBIN TIME: CPT | Performed by: NURSE PRACTITIONER

## 2024-08-05 PROCEDURE — 2500000004 HC RX 250 GENERAL PHARMACY W/ HCPCS (ALT 636 FOR OP/ED): Performed by: NURSE PRACTITIONER

## 2024-08-05 PROCEDURE — 85025 COMPLETE CBC W/AUTO DIFF WBC: CPT | Performed by: NURSE PRACTITIONER

## 2024-08-05 PROCEDURE — 2550000001 HC RX 255 CONTRASTS: Performed by: NURSE PRACTITIONER

## 2024-08-05 PROCEDURE — 93005 ELECTROCARDIOGRAM TRACING: CPT

## 2024-08-05 PROCEDURE — 84075 ASSAY ALKALINE PHOSPHATASE: CPT | Performed by: NURSE PRACTITIONER

## 2024-08-05 PROCEDURE — 82565 ASSAY OF CREATININE: CPT | Performed by: NURSE PRACTITIONER

## 2024-08-05 PROCEDURE — 81003 URINALYSIS AUTO W/O SCOPE: CPT | Performed by: NURSE PRACTITIONER

## 2024-08-05 PROCEDURE — 74177 CT ABD & PELVIS W/CONTRAST: CPT

## 2024-08-05 RX ORDER — ONDANSETRON 4 MG/1
4 TABLET, ORALLY DISINTEGRATING ORAL EVERY 8 HOURS PRN
Qty: 6 TABLET | Refills: 0 | Status: SHIPPED | OUTPATIENT
Start: 2024-08-05 | End: 2024-08-07

## 2024-08-05 RX ORDER — HYDROCODONE BITARTRATE AND ACETAMINOPHEN 5; 325 MG/1; MG/1
1 TABLET ORAL EVERY 6 HOURS PRN
Qty: 12 TABLET | Refills: 0 | Status: SHIPPED | OUTPATIENT
Start: 2024-08-05

## 2024-08-05 RX ORDER — NAPROXEN 500 MG/1
500 TABLET ORAL
Qty: 30 TABLET | Refills: 0 | Status: SHIPPED | OUTPATIENT
Start: 2024-08-05 | End: 2024-08-20

## 2024-08-05 ASSESSMENT — LIFESTYLE VARIABLES
EVER FELT BAD OR GUILTY ABOUT YOUR DRINKING: NO
HAVE PEOPLE ANNOYED YOU BY CRITICIZING YOUR DRINKING: NO
TOTAL SCORE: 0
HAVE YOU EVER FELT YOU SHOULD CUT DOWN ON YOUR DRINKING: NO
EVER HAD A DRINK FIRST THING IN THE MORNING TO STEADY YOUR NERVES TO GET RID OF A HANGOVER: NO

## 2024-08-05 ASSESSMENT — PAIN - FUNCTIONAL ASSESSMENT
PAIN_FUNCTIONAL_ASSESSMENT: 0-10
PAIN_FUNCTIONAL_ASSESSMENT: 0-10

## 2024-08-05 ASSESSMENT — PAIN SCALES - GENERAL
PAINLEVEL_OUTOF10: 4
PAINLEVEL_OUTOF10: 7

## 2024-08-05 ASSESSMENT — COLUMBIA-SUICIDE SEVERITY RATING SCALE - C-SSRS
6. HAVE YOU EVER DONE ANYTHING, STARTED TO DO ANYTHING, OR PREPARED TO DO ANYTHING TO END YOUR LIFE?: NO
2. HAVE YOU ACTUALLY HAD ANY THOUGHTS OF KILLING YOURSELF?: NO
1. IN THE PAST MONTH, HAVE YOU WISHED YOU WERE DEAD OR WISHED YOU COULD GO TO SLEEP AND NOT WAKE UP?: NO

## 2024-08-05 NOTE — DISCHARGE INSTRUCTIONS
Your CT scan showed multiple findings.  You have stones in your gallbladder, recommend following up with gastroenterology and or general surgery.    A kidney mass was present, follow-up with urology    I also referred you to the [facility] cancer diagnostic clinic for full evaluation.

## 2024-08-05 NOTE — ED PROVIDER NOTES
HPI   Chief Complaint   Patient presents with    Back Pain     Mid back pain 3-4 weeks and some dry heaving        63-year-old male presents emergency department, states for last 3 or 4 weeks has been having pain, describes pain across his back, wrapping around to his abdomen and radiating up into his chest.  States he gets dry heaving with this.  States these episodes seem to come and go, he did tell his doctor about them who sent him for labs but had an episode last night, started at 4 AM when he got up to use the bathroom.  States he seemed to get worse when he gets up to go to the bathroom, had bowel movement.    States nausea this morning but took an ODT Zofran and his nausea is improved.    States relatively healthy male otherwise, no previous abdominal history.      History provided by:  Patient   used: No            Patient History   History reviewed. No pertinent past medical history.  Past Surgical History:   Procedure Laterality Date    OTHER SURGICAL HISTORY  06/18/2019    Hand surgery     Family History   Problem Relation Name Age of Onset    Cancer Sister       Social History     Tobacco Use    Smoking status: Not on file    Smokeless tobacco: Not on file   Substance Use Topics    Alcohol use: Not on file    Drug use: Not on file       Physical Exam   ED Triage Vitals [08/05/24 1120]   Temperature Heart Rate Respirations BP   36.5 °C (97.7 °F) 87 16 124/70      Pulse Ox Temp src Heart Rate Source Patient Position   98 % -- -- --      BP Location FiO2 (%)     -- --       Physical Exam  Physical Exam:  Constitutional: Vitals noted, no distress. Afebrile.   Cardiovascular: Regular, rate, rhythm, no murmur.   Pulmonary: Lungs clear bilaterally with good aeration. No adventitious breath sounds.   Gastrointestinal: Soft, nonsurgical.  Mild discomfort right upper and left upper quadrants. No peritoneal signs. Normoactive bowel sounds.   Musculoskeletal: No peripheral edema. Negative Homans  bilaterally, no cords.   Skin: No rash.   Neuro: No focal neurologic deficits, NIH score of 0.      ED Course & MDM   ED Course as of 08/05/24 1626   Mon Aug 05, 2024   1612 CT abdomen pelvis w IV contrast [LV]      ED Course User Index  [LV] Shilpi LASHELL Dowling, APRN-CNP         Diagnoses as of 08/05/24 1626   Calculus of gallbladder without cholecystitis without obstruction   Renal mass   Hepatic cyst          Labs Reviewed   BASIC METABOLIC PANEL - Abnormal       Result Value    Glucose 143 (*)     Sodium 138      Potassium 5.0      Chloride 106      Bicarbonate 25      Anion Gap 12      Urea Nitrogen 14      Creatinine 0.89      eGFR >90      Calcium 9.3     URINALYSIS WITH REFLEX CULTURE AND MICROSCOPIC - Abnormal    Color, Urine Light-Yellow      Appearance, Urine Clear      Specific Gravity, Urine 1.018      pH, Urine 5.5      Protein, Urine NEGATIVE      Glucose, Urine 30 (TRACE) (*)     Blood, Urine NEGATIVE      Ketones, Urine NEGATIVE      Bilirubin, Urine NEGATIVE      Urobilinogen, Urine Normal      Nitrite, Urine NEGATIVE      Leukocyte Esterase, Urine NEGATIVE     LIPASE - Normal    Lipase 65      Narrative:     Venipuncture immediately after or during the administration of Metamizole may lead to falsely low results. Testing should be performed immediately prior to Metamizole dosing.   HEPATIC FUNCTION PANEL - Normal    Albumin 4.1      Bilirubin, Total 0.2      Bilirubin, Direct 0.1      Alkaline Phosphatase 37      ALT 15      AST 23      Total Protein 7.2     LACTATE - Normal    Lactate 0.9      Narrative:     Venipuncture immediately after or during the administration of Metamizole may lead to falsely low results. Testing should be performed immediately  prior to Metamizole dosing.   TROPONIN I, HIGH SENSITIVITY - Normal    Troponin I, High Sensitivity <3      Narrative:     Less than 99th percentile of normal range cutoff-  Female and children under 18 years old <14 ng/L; Male <21 ng/L:  Negative  Repeat testing should be performed if clinically indicated.     Female and children under 18 years old 14-50 ng/L; Male 21-50 ng/L:  Consistent with possible cardiac damage and possible increased clinical   risk. Serial measurements may help to assess extent of myocardial damage.     >50 ng/L: Consistent with cardiac damage, increased clinical risk and  myocardial infarction. Serial measurements may help assess extent of   myocardial damage.      NOTE: Children less than 1 year old may have higher baseline troponin   levels and results should be interpreted in conjunction with the overall   clinical context.     NOTE: Troponin I testing is performed using a different   testing methodology at [facility] than at other   [facility]. Direct result comparisons should only   be made within the same method.   PROTIME-INR - Normal    Protime 10.9      INR 1.0     CBC WITH AUTO DIFFERENTIAL    WBC 8.7      nRBC 0.0      RBC 5.26      Hemoglobin 15.7      Hematocrit 46.9      MCV 89      MCH 29.8      MCHC 33.5      RDW 13.2      Platelets 299      Neutrophils % 57.3      Immature Granulocytes %, Automated 0.2      Lymphocytes % 33.9      Monocytes % 5.3      Eosinophils % 2.6      Basophils % 0.7      Neutrophils Absolute 4.98      Immature Granulocytes Absolute, Automated 0.02      Lymphocytes Absolute 2.95      Monocytes Absolute 0.46      Eosinophils Absolute 0.23      Basophils Absolute 0.06     URINALYSIS WITH REFLEX CULTURE AND MICROSCOPIC    Narrative:     The following orders were created for panel order Urinalysis with Reflex Culture and Microscopic.  Procedure                               Abnormality         Status                     ---------                               -----------         ------                     Urinalysis with Reflex C...[[id number]]  Abnormal            Final result               Extra Urine Gray Tube[[id number]]                            In process                    Please view results for these tests on the individual orders.   EXTRA URINE GRAY TUBE        CT abdomen pelvis w IV contrast   Final Result   1. Heterogeneous mass in the interpolar portion of the left kidney   measuring 5.5 x 5.3 x 5.7 cm, concerning for renal cell carcinoma.        2. Inferior right hepatic lobe lesion measuring 3.6 x 3.6 cm,   indeterminate on this single portal venous phase examination.   Recommend dedicated CT or MRI hepatic imaging for further evaluation.        3. Cholelithiasis without evidence for cholecystitis.        4. Chronic bilateral pars defects at L5 without spondylolisthesis.        Signed by: Levi Vera 8/5/2024 3:24 PM   Dictation workstation:   [id number]                       Bixby Coma Scale Score: 15                        Medical Decision Making  Patient presents complaining of pain, describes pain mostly overnight, complains of pain across to his back, radiating around into his abdomen, worse on the right side.  Radiates up into his chest as well.    EKG at 1148 with ventricular of 78, as read by me, shows normal sinus rhythm with normal axis normal interval, unremarkable ST and T wave pattern, no evidence of acute ischemia or other acute findings.    Patient declined any medication for pain or nausea.  CBC unremarkable, metabolic panel unremarkable, normal lactate and lipase, negative troponin.  Urinalysis unremarkable as well.    CT imaging of the abdomen/pelvis with multiple findings, cholelithiasis without cholecystitis, heterogenous mass left kidney concerning for renal cell carcinoma, hepatic lobe lesion.    I had a detailed conversation with the patient and his wife about these findings, placed multiple referrals for the patient including GI and general surgery, urology, [facility] tumor clinic.    Discussed diet and pain control in the meantime with the patient, discussed return with any worsening symptoms or any additional concerns, otherwise close  follow-up as referred.        Shared ANI Attestation:    This patient was seen by the advanced practice provider.  I personally saw the patient and made/approved the management plan and take responsibility for the patient management.    History:  63-year-old male presents with back pain.    Exam: Regular rate and rhythm cardiac exam with clear breath sounds bilaterally.  Abdomen is soft with mild tenderness to palpation to the upper abdomen.  No rebound or guarding.  Neurological exam is grossly intact.    MDM: ACS, pancreatitis, cholecystitis, pyelonephritis    Labs Reviewed   BASIC METABOLIC PANEL - Abnormal       Result Value    Glucose 143 (*)     Sodium 138      Potassium 5.0      Chloride 106      Bicarbonate 25      Anion Gap 12      Urea Nitrogen 14      Creatinine 0.89      eGFR >90      Calcium 9.3     URINALYSIS WITH REFLEX CULTURE AND MICROSCOPIC - Abnormal    Color, Urine Light-Yellow      Appearance, Urine Clear      Specific Gravity, Urine 1.018      pH, Urine 5.5      Protein, Urine NEGATIVE      Glucose, Urine 30 (TRACE) (*)     Blood, Urine NEGATIVE      Ketones, Urine NEGATIVE      Bilirubin, Urine NEGATIVE      Urobilinogen, Urine Normal      Nitrite, Urine NEGATIVE      Leukocyte Esterase, Urine NEGATIVE     LIPASE - Normal    Lipase 65      Narrative:     Venipuncture immediately after or during the administration of Metamizole may lead to falsely low results. Testing should be performed immediately prior to Metamizole dosing.   HEPATIC FUNCTION PANEL - Normal    Albumin 4.1      Bilirubin, Total 0.2      Bilirubin, Direct 0.1      Alkaline Phosphatase 37      ALT 15      AST 23      Total Protein 7.2     LACTATE - Normal    Lactate 0.9      Narrative:     Venipuncture immediately after or during the administration of Metamizole may lead to falsely low results. Testing should be performed immediately  prior to Metamizole dosing.   TROPONIN I, HIGH SENSITIVITY - Normal    Troponin I, High  Sensitivity <3      Narrative:     Less than 99th percentile of normal range cutoff-  Female and children under 18 years old <14 ng/L; Male <21 ng/L: Negative  Repeat testing should be performed if clinically indicated.     Female and children under 18 years old 14-50 ng/L; Male 21-50 ng/L:  Consistent with possible cardiac damage and possible increased clinical   risk. Serial measurements may help to assess extent of myocardial damage.     >50 ng/L: Consistent with cardiac damage, increased clinical risk and  myocardial infarction. Serial measurements may help assess extent of   myocardial damage.      NOTE: Children less than 1 year old may have higher baseline troponin   levels and results should be interpreted in conjunction with the overall   clinical context.     NOTE: Troponin I testing is performed using a different   testing methodology at [facility] than at other   [facility]. Direct result comparisons should only   be made within the same method.   PROTIME-INR - Normal    Protime 10.9      INR 1.0     CBC WITH AUTO DIFFERENTIAL    WBC 8.7      nRBC 0.0      RBC 5.26      Hemoglobin 15.7      Hematocrit 46.9      MCV 89      MCH 29.8      MCHC 33.5      RDW 13.2      Platelets 299      Neutrophils % 57.3      Immature Granulocytes %, Automated 0.2      Lymphocytes % 33.9      Monocytes % 5.3      Eosinophils % 2.6      Basophils % 0.7      Neutrophils Absolute 4.98      Immature Granulocytes Absolute, Automated 0.02      Lymphocytes Absolute 2.95      Monocytes Absolute 0.46      Eosinophils Absolute 0.23      Basophils Absolute 0.06     URINALYSIS WITH REFLEX CULTURE AND MICROSCOPIC    Narrative:     The following orders were created for panel order Urinalysis with Reflex Culture and Microscopic.  Procedure                               Abnormality         Status                     ---------                               -----------         ------                     Urinalysis with  Reflex C...[[id number]]  Abnormal            Final result               Extra Urine Gray Tube[[id number]]                            In process                   Please view results for these tests on the individual orders.   EXTRA URINE GRAY TUBE       CT abdomen pelvis w IV contrast   Final Result   1. Heterogeneous mass in the interpolar portion of the left kidney   measuring 5.5 x 5.3 x 5.7 cm, concerning for renal cell carcinoma.        2. Inferior right hepatic lobe lesion measuring 3.6 x 3.6 cm,   indeterminate on this single portal venous phase examination.   Recommend dedicated CT or MRI hepatic imaging for further evaluation.        3. Cholelithiasis without evidence for cholecystitis.        4. Chronic bilateral pars defects at L5 without spondylolisthesis.        Signed by: Levi Vera 8/5/2024 3:24 PM   Dictation workstation:   [id number]           I have seen and examined the patient, agree with the workup, evaluation, medical decision making, management and diagnosis.  The care plan has been discussed.    Zac English MD      Procedure  Procedures     Shilpi Dowling, CECILLE-RALPH  08/05/24 1640

## 2024-08-06 ENCOUNTER — TELEPHONE (OUTPATIENT)
Dept: HEMATOLOGY/ONCOLOGY | Facility: HOSPITAL | Age: 64
End: 2024-08-06
Payer: COMMERCIAL

## 2024-08-06 DIAGNOSIS — K76.9 LIVER LESION: ICD-10-CM

## 2024-08-06 DIAGNOSIS — N28.89 LEFT RENAL MASS: Primary | ICD-10-CM

## 2024-08-06 NOTE — TELEPHONE ENCOUNTER
Lorelei Jara returned my call to the office, on behalf of her  Rm. I discussed his imaging findings and referral to the diagnostic clinic. MRI abd ordered. Referral placed for Urology, Dr. Bell, for evaluation and mgmt of left renal mass, concerning for renal cell carcinoma. Message sent to Kareo to assist w/ scheduling both orders. Discussed I will follow-up w/ Mr Jara after his MRI to discuss results. She and her  have the diagnostic clinic contact information if they have any questions or issues.  CECILLE Hassan.[facility]     Orders Placed This Encounter   Procedures    MR abdomen w and wo IV contrast    Referral to Urology

## 2024-08-06 NOTE — TELEPHONE ENCOUNTER
Referral placed to [facility] by Shilpi Dowling NP, [facility] ED, for left renal mass revealed on CT A/P yesterday, 8/5/24. I called the patient to discuss his imaging results and the referral, as well as to arrange next steps. No answer; I left  requesting he return the call to our office. I will reach out to him again, also.  CECILLE Hassan.[facility]

## 2024-08-08 ENCOUNTER — APPOINTMENT (OUTPATIENT)
Dept: HEMATOLOGY/ONCOLOGY | Facility: HOSPITAL | Age: 64
End: 2024-08-08
Payer: COMMERCIAL

## 2024-08-13 ENCOUNTER — OFFICE VISIT (OUTPATIENT)
Dept: SURGERY | Age: 64
End: 2024-08-13
Payer: COMMERCIAL

## 2024-08-13 VITALS
WEIGHT: 185 LBS | HEIGHT: 70 IN | BODY MASS INDEX: 26.48 KG/M2 | TEMPERATURE: 97.6 F | OXYGEN SATURATION: 98 % | HEART RATE: 79 BPM

## 2024-08-13 DIAGNOSIS — K80.10 CALCULUS OF GALLBLADDER WITH CHRONIC CHOLECYSTITIS WITHOUT OBSTRUCTION: Primary | ICD-10-CM

## 2024-08-13 PROCEDURE — 99203 OFFICE O/P NEW LOW 30 MIN: CPT | Performed by: COLON & RECTAL SURGERY

## 2024-08-13 RX ORDER — SODIUM CHLORIDE 9 MG/ML
INJECTION, SOLUTION INTRAVENOUS PRN
Status: CANCELLED | OUTPATIENT
Start: 2024-08-15

## 2024-08-13 RX ORDER — SODIUM CHLORIDE 0.9 % (FLUSH) 0.9 %
5-40 SYRINGE (ML) INJECTION EVERY 12 HOURS SCHEDULED
Status: CANCELLED | OUTPATIENT
Start: 2024-08-15

## 2024-08-13 RX ORDER — SODIUM CHLORIDE 0.9 % (FLUSH) 0.9 %
5-40 SYRINGE (ML) INJECTION PRN
Status: CANCELLED | OUTPATIENT
Start: 2024-08-15

## 2024-08-13 RX ORDER — SEMAGLUTIDE 0.68 MG/ML
INJECTION, SOLUTION SUBCUTANEOUS
COMMUNITY
Start: 2024-08-06

## 2024-08-13 ASSESSMENT — ENCOUNTER SYMPTOMS
VOMITING: 0
SHORTNESS OF BREATH: 0
DIARRHEA: 0
CONSTIPATION: 0
ABDOMINAL PAIN: 1
NAUSEA: 1
CHEST TIGHTNESS: 0
COLOR CHANGE: 0

## 2024-08-13 NOTE — PROGRESS NOTES
Subjective:      Patient ID: Mg Owens is a 63 y.o. male who presents for:  Chief Complaint   Patient presents with    New Patient       This is a 63-year-old male who has had problems with postprandial nausea with abdominal pain over the past 6 to 7 months.  His nausea has lasted for over 1 year.  He has a CT scan of his abdomen which showed gallstones.  Also on CAT scan was a left renal mass which was suspicious for renal cell carcinoma.  These films were done at [facility].    He also complained of intermittent right groin pain which has resolved.    Given his clinical suspicion of gallbladder disease, we discussed the risks and benefits of laparoscopic cholecystectomy with cholangiogram.  He would like to address this prior to addressing the left renal mass which he knows he will need to soon.  He has an appointment in the next 2 to 3 weeks with the urologist regarding this mass.    Past medical and surgical history was reviewed.  Imaging was reviewed        No past medical history on file.  No past surgical history on file.  Social History     Socioeconomic History    Marital status:      Spouse name: Not on file    Number of children: Not on file    Years of education: Not on file    Highest education level: Not on file   Occupational History    Not on file   Tobacco Use    Smoking status: Never    Smokeless tobacco: Never   Substance and Sexual Activity    Alcohol use: Not on file    Drug use: Not on file    Sexual activity: Not on file   Other Topics Concern    Not on file   Social History Narrative    Not on file     Social Determinants of Health     Financial Resource Strain: Not on file   Food Insecurity: Not on file   Transportation Needs: Not on file   Physical Activity: Not on file   Stress: Not on file   Social Connections: Not on file   Intimate Partner Violence: Not on file   Housing Stability: Not on file     No family history on file.  Allergies:  Patient has no known

## 2024-08-14 RX ORDER — ATORVASTATIN CALCIUM 40 MG/1
40 TABLET, FILM COATED ORAL DAILY
COMMUNITY
Start: 2019-06-18

## 2024-08-14 RX ORDER — ONDANSETRON 4 MG/1
4 TABLET, ORALLY DISINTEGRATING ORAL EVERY 8 HOURS PRN
COMMUNITY
Start: 2024-08-05

## 2024-08-15 ENCOUNTER — HOSPITAL ENCOUNTER (OUTPATIENT)
Age: 64
Setting detail: OUTPATIENT SURGERY
Discharge: HOME | End: 2024-08-15
Attending: COLON & RECTAL SURGERY | Admitting: COLON & RECTAL SURGERY
Payer: COMMERCIAL

## 2024-08-15 ENCOUNTER — ANESTHESIA EVENT (OUTPATIENT)
Dept: OPERATING ROOM | Age: 64
End: 2024-08-15
Payer: COMMERCIAL

## 2024-08-15 ENCOUNTER — APPOINTMENT (OUTPATIENT)
Dept: GENERAL RADIOLOGY | Age: 64
End: 2024-08-15
Attending: COLON & RECTAL SURGERY
Payer: COMMERCIAL

## 2024-08-15 ENCOUNTER — ANESTHESIA (OUTPATIENT)
Dept: OPERATING ROOM | Age: 64
End: 2024-08-15
Payer: COMMERCIAL

## 2024-08-15 VITALS
SYSTOLIC BLOOD PRESSURE: 128 MMHG | WEIGHT: 183 LBS | BODY MASS INDEX: 27.11 KG/M2 | OXYGEN SATURATION: 99 % | HEIGHT: 69 IN | DIASTOLIC BLOOD PRESSURE: 66 MMHG | HEART RATE: 99 BPM | TEMPERATURE: 97 F | RESPIRATION RATE: 20 BRPM

## 2024-08-15 DIAGNOSIS — K80.10: ICD-10-CM

## 2024-08-15 LAB
GLUCOSE BLD-MCNC: 179 MG/DL (ref 70–99)
GLUCOSE BLD-MCNC: 234 MG/DL (ref 70–99)
PERFORMED ON: ABNORMAL
PERFORMED ON: ABNORMAL

## 2024-08-15 PROCEDURE — 2500000003 HC RX 250 WO HCPCS: Performed by: NURSE ANESTHETIST, CERTIFIED REGISTERED

## 2024-08-15 PROCEDURE — 88304 TISSUE EXAM BY PATHOLOGIST: CPT

## 2024-08-15 PROCEDURE — A4217 STERILE WATER/SALINE, 500 ML: HCPCS | Performed by: COLON & RECTAL SURGERY

## 2024-08-15 PROCEDURE — 6360000002 HC RX W HCPCS: Performed by: STUDENT IN AN ORGANIZED HEALTH CARE EDUCATION/TRAINING PROGRAM

## 2024-08-15 PROCEDURE — 7100000011 HC PHASE II RECOVERY - ADDTL 15 MIN: Performed by: COLON & RECTAL SURGERY

## 2024-08-15 PROCEDURE — 64488 TAP BLOCK BI INJECTION: CPT | Performed by: STUDENT IN AN ORGANIZED HEALTH CARE EDUCATION/TRAINING PROGRAM

## 2024-08-15 PROCEDURE — C1758 CATHETER, URETERAL: HCPCS | Performed by: COLON & RECTAL SURGERY

## 2024-08-15 PROCEDURE — 7100000000 HC PACU RECOVERY - FIRST 15 MIN: Performed by: COLON & RECTAL SURGERY

## 2024-08-15 PROCEDURE — 6360000002 HC RX W HCPCS: Performed by: NURSE ANESTHETIST, CERTIFIED REGISTERED

## 2024-08-15 PROCEDURE — 6370000000 HC RX 637 (ALT 250 FOR IP): Performed by: STUDENT IN AN ORGANIZED HEALTH CARE EDUCATION/TRAINING PROGRAM

## 2024-08-15 PROCEDURE — 3600000014 HC SURGERY LEVEL 4 ADDTL 15MIN: Performed by: COLON & RECTAL SURGERY

## 2024-08-15 PROCEDURE — 2709999900 HC NON-CHARGEABLE SUPPLY: Performed by: COLON & RECTAL SURGERY

## 2024-08-15 PROCEDURE — 7100000001 HC PACU RECOVERY - ADDTL 15 MIN: Performed by: COLON & RECTAL SURGERY

## 2024-08-15 PROCEDURE — 2580000003 HC RX 258: Performed by: COLON & RECTAL SURGERY

## 2024-08-15 PROCEDURE — 6370000000 HC RX 637 (ALT 250 FOR IP)

## 2024-08-15 PROCEDURE — 47563 LAPARO CHOLECYSTECTOMY/GRAPH: CPT | Performed by: COLON & RECTAL SURGERY

## 2024-08-15 PROCEDURE — 2580000003 HC RX 258: Performed by: STUDENT IN AN ORGANIZED HEALTH CARE EDUCATION/TRAINING PROGRAM

## 2024-08-15 PROCEDURE — 3700000001 HC ADD 15 MINUTES (ANESTHESIA): Performed by: COLON & RECTAL SURGERY

## 2024-08-15 PROCEDURE — 6370000000 HC RX 637 (ALT 250 FOR IP): Performed by: COLON & RECTAL SURGERY

## 2024-08-15 PROCEDURE — 6360000004 HC RX CONTRAST MEDICATION: Performed by: COLON & RECTAL SURGERY

## 2024-08-15 PROCEDURE — 76000 FLUOROSCOPY <1 HR PHYS/QHP: CPT

## 2024-08-15 PROCEDURE — 2500000003 HC RX 250 WO HCPCS: Performed by: COLON & RECTAL SURGERY

## 2024-08-15 PROCEDURE — 7100000010 HC PHASE II RECOVERY - FIRST 15 MIN: Performed by: COLON & RECTAL SURGERY

## 2024-08-15 PROCEDURE — 3600000004 HC SURGERY LEVEL 4 BASE: Performed by: COLON & RECTAL SURGERY

## 2024-08-15 PROCEDURE — 3700000000 HC ANESTHESIA ATTENDED CARE: Performed by: COLON & RECTAL SURGERY

## 2024-08-15 PROCEDURE — 6360000002 HC RX W HCPCS: Performed by: COLON & RECTAL SURGERY

## 2024-08-15 RX ORDER — SODIUM CHLORIDE 0.9 % (FLUSH) 0.9 %
5-40 SYRINGE (ML) INJECTION EVERY 12 HOURS SCHEDULED
Status: DISCONTINUED | OUTPATIENT
Start: 2024-08-15 | End: 2024-08-15 | Stop reason: HOSPADM

## 2024-08-15 RX ORDER — SODIUM CHLORIDE 9 MG/ML
INJECTION, SOLUTION INTRAVENOUS PRN
Status: DISCONTINUED | OUTPATIENT
Start: 2024-08-15 | End: 2024-08-15 | Stop reason: HOSPADM

## 2024-08-15 RX ORDER — OXYCODONE HYDROCHLORIDE 5 MG/1
5 TABLET ORAL
Status: COMPLETED | OUTPATIENT
Start: 2024-08-15 | End: 2024-08-15

## 2024-08-15 RX ORDER — ONDANSETRON 2 MG/ML
INJECTION INTRAMUSCULAR; INTRAVENOUS PRN
Status: DISCONTINUED | OUTPATIENT
Start: 2024-08-15 | End: 2024-08-15 | Stop reason: SDUPTHER

## 2024-08-15 RX ORDER — PROPOFOL 10 MG/ML
INJECTION, EMULSION INTRAVENOUS PRN
Status: DISCONTINUED | OUTPATIENT
Start: 2024-08-15 | End: 2024-08-15 | Stop reason: SDUPTHER

## 2024-08-15 RX ORDER — SODIUM CHLORIDE, SODIUM LACTATE, POTASSIUM CHLORIDE, CALCIUM CHLORIDE 600; 310; 30; 20 MG/100ML; MG/100ML; MG/100ML; MG/100ML
INJECTION, SOLUTION INTRAVENOUS CONTINUOUS
Status: DISCONTINUED | OUTPATIENT
Start: 2024-08-15 | End: 2024-08-15 | Stop reason: HOSPADM

## 2024-08-15 RX ORDER — FENTANYL CITRATE 0.05 MG/ML
25 INJECTION, SOLUTION INTRAMUSCULAR; INTRAVENOUS EVERY 5 MIN PRN
Status: COMPLETED | OUTPATIENT
Start: 2024-08-15 | End: 2024-08-15

## 2024-08-15 RX ORDER — ROPIVACAINE HYDROCHLORIDE 5 MG/ML
INJECTION, SOLUTION EPIDURAL; INFILTRATION; PERINEURAL
Status: COMPLETED | OUTPATIENT
Start: 2024-08-15 | End: 2024-08-15

## 2024-08-15 RX ORDER — SODIUM CHLORIDE 0.9 % (FLUSH) 0.9 %
5-40 SYRINGE (ML) INJECTION PRN
Status: DISCONTINUED | OUTPATIENT
Start: 2024-08-15 | End: 2024-08-15 | Stop reason: HOSPADM

## 2024-08-15 RX ORDER — ROCURONIUM BROMIDE 10 MG/ML
INJECTION, SOLUTION INTRAVENOUS PRN
Status: DISCONTINUED | OUTPATIENT
Start: 2024-08-15 | End: 2024-08-15 | Stop reason: SDUPTHER

## 2024-08-15 RX ORDER — LIDOCAINE HYDROCHLORIDE 10 MG/ML
1 INJECTION, SOLUTION EPIDURAL; INFILTRATION; INTRACAUDAL; PERINEURAL
Status: DISCONTINUED | OUTPATIENT
Start: 2024-08-15 | End: 2024-08-15 | Stop reason: HOSPADM

## 2024-08-15 RX ORDER — IOPAMIDOL 612 MG/ML
INJECTION, SOLUTION INTRATHECAL PRN
Status: DISCONTINUED | OUTPATIENT
Start: 2024-08-15 | End: 2024-08-15 | Stop reason: ALTCHOICE

## 2024-08-15 RX ORDER — ONDANSETRON 2 MG/ML
4 INJECTION INTRAMUSCULAR; INTRAVENOUS
Status: DISCONTINUED | OUTPATIENT
Start: 2024-08-15 | End: 2024-08-15 | Stop reason: HOSPADM

## 2024-08-15 RX ORDER — OXYCODONE HYDROCHLORIDE AND ACETAMINOPHEN 5; 325 MG/1; MG/1
1 TABLET ORAL EVERY 6 HOURS PRN
Qty: 12 TABLET | Refills: 0 | Status: SHIPPED | OUTPATIENT
Start: 2024-08-15 | End: 2024-08-18

## 2024-08-15 RX ORDER — NALOXONE HYDROCHLORIDE 0.4 MG/ML
INJECTION, SOLUTION INTRAMUSCULAR; INTRAVENOUS; SUBCUTANEOUS PRN
Status: DISCONTINUED | OUTPATIENT
Start: 2024-08-15 | End: 2024-08-15 | Stop reason: HOSPADM

## 2024-08-15 RX ORDER — WOUND DRESSING ADHESIVE - LIQUID
LIQUID MISCELLANEOUS PRN
Status: DISCONTINUED | OUTPATIENT
Start: 2024-08-15 | End: 2024-08-15 | Stop reason: ALTCHOICE

## 2024-08-15 RX ORDER — METOCLOPRAMIDE HYDROCHLORIDE 5 MG/ML
10 INJECTION INTRAMUSCULAR; INTRAVENOUS
Status: COMPLETED | OUTPATIENT
Start: 2024-08-15 | End: 2024-08-15

## 2024-08-15 RX ORDER — MAGNESIUM HYDROXIDE 1200 MG/15ML
LIQUID ORAL CONTINUOUS PRN
Status: COMPLETED | OUTPATIENT
Start: 2024-08-15 | End: 2024-08-15

## 2024-08-15 RX ORDER — HYDRALAZINE HYDROCHLORIDE 20 MG/ML
INJECTION INTRAMUSCULAR; INTRAVENOUS PRN
Status: DISCONTINUED | OUTPATIENT
Start: 2024-08-15 | End: 2024-08-15 | Stop reason: SDUPTHER

## 2024-08-15 RX ORDER — FENTANYL CITRATE 50 UG/ML
INJECTION, SOLUTION INTRAMUSCULAR; INTRAVENOUS PRN
Status: DISCONTINUED | OUTPATIENT
Start: 2024-08-15 | End: 2024-08-15 | Stop reason: SDUPTHER

## 2024-08-15 RX ORDER — LIDOCAINE HYDROCHLORIDE 20 MG/ML
INJECTION, SOLUTION INFILTRATION; PERINEURAL PRN
Status: DISCONTINUED | OUTPATIENT
Start: 2024-08-15 | End: 2024-08-15 | Stop reason: SDUPTHER

## 2024-08-15 RX ORDER — ACETAMINOPHEN 325 MG/1
650 TABLET ORAL
Status: DISCONTINUED | OUTPATIENT
Start: 2024-08-15 | End: 2024-08-15 | Stop reason: HOSPADM

## 2024-08-15 RX ADMIN — FENTANYL CITRATE 50 MCG: 50 INJECTION, SOLUTION INTRAMUSCULAR; INTRAVENOUS at 09:07

## 2024-08-15 RX ADMIN — HYDROMORPHONE HYDROCHLORIDE 0.5 MG: 1 INJECTION, SOLUTION INTRAMUSCULAR; INTRAVENOUS; SUBCUTANEOUS at 10:21

## 2024-08-15 RX ADMIN — HYDRALAZINE HYDROCHLORIDE 5 MG: 20 INJECTION INTRAMUSCULAR; INTRAVENOUS at 09:46

## 2024-08-15 RX ADMIN — HYDRALAZINE HYDROCHLORIDE 10 MG: 20 INJECTION INTRAMUSCULAR; INTRAVENOUS at 09:52

## 2024-08-15 RX ADMIN — FENTANYL CITRATE 25 MCG: 0.05 INJECTION, SOLUTION INTRAMUSCULAR; INTRAVENOUS at 10:31

## 2024-08-15 RX ADMIN — ONDANSETRON 4 MG: 2 INJECTION INTRAMUSCULAR; INTRAVENOUS at 10:06

## 2024-08-15 RX ADMIN — ROPIVACAINE HYDROCHLORIDE 30 ML: 5 INJECTION, SOLUTION EPIDURAL; INFILTRATION; PERINEURAL at 09:13

## 2024-08-15 RX ADMIN — OXYCODONE HYDROCHLORIDE 5 MG: 5 TABLET ORAL at 11:24

## 2024-08-15 RX ADMIN — FENTANYL CITRATE 50 MCG: 50 INJECTION, SOLUTION INTRAMUSCULAR; INTRAVENOUS at 09:40

## 2024-08-15 RX ADMIN — PROPOFOL 150 MG: 10 INJECTION, EMULSION INTRAVENOUS at 09:11

## 2024-08-15 RX ADMIN — SODIUM CHLORIDE, POTASSIUM CHLORIDE, SODIUM LACTATE AND CALCIUM CHLORIDE: 600; 310; 30; 20 INJECTION, SOLUTION INTRAVENOUS at 08:00

## 2024-08-15 RX ADMIN — FENTANYL CITRATE 25 MCG: 0.05 INJECTION, SOLUTION INTRAMUSCULAR; INTRAVENOUS at 10:36

## 2024-08-15 RX ADMIN — LIDOCAINE HYDROCHLORIDE 50 MG: 20 INJECTION, SOLUTION INFILTRATION; PERINEURAL at 09:11

## 2024-08-15 RX ADMIN — ROCURONIUM BROMIDE 50 MG: 10 INJECTION INTRAVENOUS at 09:11

## 2024-08-15 RX ADMIN — HYDRALAZINE HYDROCHLORIDE 5 MG: 20 INJECTION INTRAMUSCULAR; INTRAVENOUS at 09:57

## 2024-08-15 RX ADMIN — METOCLOPRAMIDE HYDROCHLORIDE 10 MG: 5 INJECTION INTRAMUSCULAR; INTRAVENOUS at 10:23

## 2024-08-15 RX ADMIN — SUGAMMADEX 200 MG: 100 INJECTION, SOLUTION INTRAVENOUS at 10:06

## 2024-08-15 RX ADMIN — CEFAZOLIN 2000 MG: 2 INJECTION, POWDER, FOR SOLUTION INTRAMUSCULAR; INTRAVENOUS at 09:15

## 2024-08-15 RX ADMIN — PROPOFOL 50 MG: 10 INJECTION, EMULSION INTRAVENOUS at 09:40

## 2024-08-15 ASSESSMENT — PAIN SCALES - GENERAL
PAINLEVEL_OUTOF10: 6
PAINLEVEL_OUTOF10: 4
PAINLEVEL_OUTOF10: 3
PAINLEVEL_OUTOF10: 5
PAINLEVEL_OUTOF10: 5
PAINLEVEL_OUTOF10: 10
PAINLEVEL_OUTOF10: 3

## 2024-08-15 ASSESSMENT — PAIN DESCRIPTION - LOCATION: LOCATION: ABDOMEN

## 2024-08-15 ASSESSMENT — PAIN - FUNCTIONAL ASSESSMENT: PAIN_FUNCTIONAL_ASSESSMENT: PREVENTS OR INTERFERES SOME ACTIVE ACTIVITIES AND ADLS

## 2024-08-15 ASSESSMENT — LIFESTYLE VARIABLES: SMOKING_STATUS: 0

## 2024-08-15 ASSESSMENT — PAIN DESCRIPTION - ORIENTATION: ORIENTATION: MID

## 2024-08-15 ASSESSMENT — PAIN DESCRIPTION - DESCRIPTORS: DESCRIPTORS: SHARP

## 2024-08-15 NOTE — OP NOTE
[facility]                   [address] [place] [address]                            OPERATIVE REPORT      PATIENT NAME: KAITLIN AMADOR              : 1960  MED REC NO: [id number]                        ROOM: [facility] OR Pool  ACCOUNT NO: [id number]                       ADMIT DATE: 08/15/2024  PROVIDER: Jaron Lopez MD      DATE OF PROCEDURE:  08/15/2024    SURGEON:  Jaron Lopez MD    ASSISTANT:  Ms. Hernández.    PREOPERATIVE DIAGNOSIS:  Cholelithiasis with chronic cholecystitis.    POSTOPERATIVE DIAGNOSIS:  Cholelithiasis with chronic cholecystitis.    PROCEDURE:  Laparoscopic cholecystectomy with intraoperative cholangiogram.    ANESTHESIA:    1. General endotracheal anesthesia.  2. Bilateral TAP block.    ESTIMATED BLOOD LOSS:  30.    SPECIMEN:  Gallbladder.    COMPLICATIONS:  None.    INDICATIONS:  63-year-old male with a clinical history, physical exam, and imaging consistent with symptomatic gallstones.  Risks and benefits of laparoscopic possible open cholecystectomy with cholangiogram described.  Risks of infection, bleeding, damage to the common bile duct, bile leak, reoperation, and failure to relieve symptoms were all addressed.    Despite the risks, he understood the benefits and wished to proceed.  Consent obtained.    DESCRIPTION OF PROCEDURE:  He was taken to the operating room, placed in the supine position.  General endotracheal anesthesia was administered.  His abdomen was prepped and draped with a ChloraPrep-containing solution.  Bilateral TAP block placed.  The time-out taken for appropriate verification.    A 5 mm infraumbilical incision was made.  An optical trocar was placed and pneumoperitoneum was established.  Subxiphoid and 2 lateral ports placed under direct visualization.    The gallbladder was held at the fundus as well as the neck.  The cystic duct was identified and circumferentiated.  A distal clip was placed on the cystic

## 2024-08-15 NOTE — PROGRESS NOTES
William Rolle saw patient to discuss anesthesia. Notified of patient's statement that a left kidney mass was found at another hospital and he has a follow up for this.

## 2024-08-15 NOTE — ANESTHESIA PROCEDURE NOTES
Peripheral Block    Patient location during procedure: OR  Reason for block: post-op pain management and at surgeon's request  Start time: 8/15/2024 9:13 AM  End time: 8/15/2024 9:18 AM  Staffing  Performed: anesthesiologist   Anesthesiologist: Shilpa Rolle MD  Performed by: Shilpa Rolle MD  Authorized by: Shilpa Rolle MD    Preanesthetic Checklist  Completed: patient identified, IV checked, site marked, risks and benefits discussed, surgical/procedural consents, equipment checked, pre-op evaluation, timeout performed, anesthesia consent given, oxygen available and monitors applied/VS acknowledged  Peripheral Block   Patient position: supine  Prep: ChloraPrep  Provider prep: mask and sterile gloves  Patient monitoring: cardiac monitor, continuous pulse ox, frequent blood pressure checks and IV access  Block type: TAP  Laterality: bilateral  Injection technique: single-shot  Guidance: ultrasound guided    Needle   Needle type: combined needle/nerve stimulator   Needle gauge: 22 G  Needle localization: anatomical landmarks and ultrasound guidance  Needle length: 10 cm  Assessment   Injection assessment: negative aspiration for heme, no paresthesia on injection, local visualized surrounding nerve on ultrasound and no intravascular symptoms  Paresthesia pain: none  Slow fractionated injection: yes  Hemodynamics: stable  Outcomes: uncomplicated and patient tolerated procedure well    Additional Notes  Ultrasound image in chart.       Consent obtained. Patient under general anesthesia and site prepped. Ultrasound used to visualize muscle layers and needle advanced under US guidance, local anesthetic deposited in transversus abdominus plane. Negative aspiration. A total of 30cc ropivacaine + 20cc NS injected, 25cc on each side. Patient tolerated well.   Medications Administered  ropivacaine (NAROPIN) injection 0.5% - Perineural   30 mL - 8/15/2024 9:13:00 AM

## 2024-08-15 NOTE — DISCHARGE INSTRUCTIONS
Band-Aids x 48 hours    Steri-Strips until office    May shower 48 hours    No driving while on pain medication    No lifting greater than 10 pounds for the next week    May take stairs    [facility]  Outpatient Discharge Instructions    To continue your care at home, please follow the instructions below and any additional discharge instructions given to you by your physician.    GENERAL ANESTHESIA:  Do not drive or operate machinery for 24hrs after discharge,  Do not drink alcohol, take tranquilizers, sleeping medication, or any other medication not directly instructed by your physician,   Do not make any important decisions or sign any legal documents for 24hrs after surgery,  Have someone with you for 24hrs after surgery to assist you as needed.    ACTIVITY:  Light activity for 24hrs,  No heavy lifting or exercise until instructed by your physician,  You may resume normal activities once instructed by your physician,  Special Instruction: ___________________________________________________________________    FLUIDS AND DIET:  An upset stomach or feeling sick (nausea) can commonly occur after surgery and/or pain medication use. To help minimize nausea:  Do not eat a heavy meal soon after your surgery,    Start with water or other clear liquids,  Advance to mild or bland items like Jell-O, dry toast, crackers, etc.,  Avoid caffeine,  Do not drink alcohol for at least 24 hours after surgery,  Your physician may prescribe anti-nausea medication if your nausea continues,  If you are free from nausea for 24hrs, you can advance to your normal diet as tolerated.    OPERATIVE SITE:  A small amount of bleeding or drainage after surgery is normal. Your physician will provide you with specific instructions on how to care for your surgical site and/or dressing.   Try not to touch your surgical site unless necessary,   Always wash your hands BEFORE and AFTER changing your dressing if instructed by your physician,

## 2024-08-15 NOTE — BRIEF OP NOTE
Brief Postoperative Note      Patient: Mg Owens  YOB: 1960  MRN: [id number]    Date of Procedure: 8/15/2024    Pre-Op Diagnosis Codes:      * Cholelithiasis and cholecystitis without obstruction [K80.10]    Post-Op Diagnosis: Same       Procedure(s):  Laparoscopic cholecystectomy with cholangiogram    Surgeon(s):  Jaron Lopez MD    Assistant:  First Assistant: Mahsa Hernández    Anesthesia: General, bilateral tap    Estimated Blood Loss (mL): 30    Complications: None    Specimens:   ID Type Source Tests Collected by Time Destination   A : GALLBLADDER Tissue Gallbladder SURGICAL PATHOLOGY Jaron Lopez MD 8/15/2024 0952        Implants:  * No implants in log *      Drains: * No LDAs found *    Findings:  Infection Present At Time Of Surgery (PATOS) (choose all levels that have infection present):  No infection present  Other Findings: Chronic cholecystitis, intraoperative cholangiogram unremarkable    Electronically signed by JARON LOPEZ MD on 8/15/2024 at 10:09 AM

## 2024-08-15 NOTE — ANESTHESIA PRE PROCEDURE
with Pre Eval content and Attending anesthesiologist reviewed and agrees with Preprocedure content              Shilpa Rolle MD   8/15/2024

## 2024-08-27 ENCOUNTER — OFFICE VISIT (OUTPATIENT)
Dept: SURGERY | Age: 64
End: 2024-08-27

## 2024-08-27 VITALS
WEIGHT: 185 LBS | HEART RATE: 76 BPM | HEIGHT: 69 IN | OXYGEN SATURATION: 98 % | TEMPERATURE: 97.1 F | BODY MASS INDEX: 27.4 KG/M2

## 2024-08-27 DIAGNOSIS — K80.10 CALCULUS OF GALLBLADDER WITH CHRONIC CHOLECYSTITIS WITHOUT OBSTRUCTION: Primary | ICD-10-CM

## 2024-08-27 PROCEDURE — 99024 POSTOP FOLLOW-UP VISIT: CPT | Performed by: COLON & RECTAL SURGERY

## 2024-08-27 NOTE — PROGRESS NOTES
Subjective:      Patient ID: Mg Owens is a 64 y.o. male who presents for:  Chief Complaint   Patient presents with    Post-Op Check       He returns to the office about 8 days out from a laparoscopic cholecystectomy for symptomatic gallstones.    He is recovering.  He has some right upper quadrant tenderness on coughing.    His bowels are working.  He denies fever.    Histology was reviewed and benign.        Past Medical History:   Diagnosis Date    Arthritis     Diabetes mellitus (HCC)     Hyperlipidemia     Hypertension      Past Surgical History:   Procedure Laterality Date    CHOLECYSTECTOMY, LAPAROSCOPIC N/A 8/15/2024    Laparoscopic cholecystectomy with cholangiogram performed by Jaron Lopez MD at [facility]    COLONOSCOPY      THUMB ARTHROSCOPY Right      Social History     Socioeconomic History    Marital status:      Spouse name: Not on file    Number of children: Not on file    Years of education: Not on file    Highest education level: Not on file   Occupational History    Not on file   Tobacco Use    Smoking status: Former     Types: Cigarettes    Smokeless tobacco: Former   Vaping Use    Vaping status: Never Used   Substance and Sexual Activity    Alcohol use: Not Currently     Comment: none in months    Drug use: Yes     Types: Marijuana (Weed)     Comment: occ    Sexual activity: Not on file   Other Topics Concern    Not on file   Social History Narrative    Not on file     Social Determinants of Health     Financial Resource Strain: Not on file   Food Insecurity: Not on file   Transportation Needs: Not on file   Physical Activity: Not on file   Stress: Not on file   Social Connections: Not on file   Intimate Partner Violence: Not on file   Housing Stability: Not on file     Family History   Problem Relation Age of Onset    Stroke Mother     Heart Disease Mother     Heart Attack Mother     High Blood Pressure Mother     Diabetes Mother     Heart Disease Father     Heart Attack

## 2024-08-29 ENCOUNTER — OFFICE VISIT (OUTPATIENT)
Dept: UROLOGY | Facility: CLINIC | Age: 64
End: 2024-08-29
Payer: COMMERCIAL

## 2024-08-29 ENCOUNTER — APPOINTMENT (OUTPATIENT)
Dept: UROLOGY | Facility: CLINIC | Age: 64
End: 2024-08-29
Payer: COMMERCIAL

## 2024-08-29 ENCOUNTER — TELEPHONE (OUTPATIENT)
Dept: UROLOGY | Facility: CLINIC | Age: 64
End: 2024-08-29

## 2024-08-29 VITALS
BODY MASS INDEX: 28.29 KG/M2 | HEART RATE: 65 BPM | WEIGHT: 191.6 LBS | SYSTOLIC BLOOD PRESSURE: 136 MMHG | DIASTOLIC BLOOD PRESSURE: 79 MMHG | TEMPERATURE: 98 F

## 2024-08-29 DIAGNOSIS — N28.89 RENAL MASS: Primary | ICD-10-CM

## 2024-08-29 DIAGNOSIS — N28.89 LEFT RENAL MASS: ICD-10-CM

## 2024-08-29 DIAGNOSIS — K76.9 LIVER LESION: ICD-10-CM

## 2024-08-29 PROCEDURE — 4010F ACE/ARB THERAPY RXD/TAKEN: CPT | Performed by: UROLOGY

## 2024-08-29 PROCEDURE — 99204 OFFICE O/P NEW MOD 45 MIN: CPT | Performed by: UROLOGY

## 2024-08-29 PROCEDURE — 3049F LDL-C 100-129 MG/DL: CPT | Performed by: UROLOGY

## 2024-08-29 PROCEDURE — 3051F HG A1C>EQUAL 7.0%<8.0%: CPT | Performed by: UROLOGY

## 2024-08-29 PROCEDURE — 3061F NEG MICROALBUMINURIA REV: CPT | Performed by: UROLOGY

## 2024-08-29 PROCEDURE — 3075F SYST BP GE 130 - 139MM HG: CPT | Performed by: UROLOGY

## 2024-08-29 PROCEDURE — 3078F DIAST BP <80 MM HG: CPT | Performed by: UROLOGY

## 2024-08-29 RX ORDER — SODIUM CHLORIDE, SODIUM LACTATE, POTASSIUM CHLORIDE, CALCIUM CHLORIDE 600; 310; 30; 20 MG/100ML; MG/100ML; MG/100ML; MG/100ML
20 INJECTION, SOLUTION INTRAVENOUS CONTINUOUS
OUTPATIENT
Start: 2024-08-29

## 2024-08-29 RX ORDER — CEFAZOLIN SODIUM 2 G/100ML
2 INJECTION, SOLUTION INTRAVENOUS ONCE
OUTPATIENT
Start: 2024-08-29 | End: 2024-08-29

## 2024-08-29 RX ORDER — HEPARIN SODIUM 5000 [USP'U]/ML
5000 INJECTION, SOLUTION INTRAVENOUS; SUBCUTANEOUS ONCE
OUTPATIENT
Start: 2024-08-29 | End: 2024-08-29

## 2024-08-29 RX ORDER — LISINOPRIL 20 MG/1
1 TABLET ORAL
COMMUNITY
Start: 2024-07-10

## 2024-08-29 NOTE — PROGRESS NOTES
Subjective   Patient ID: Rm Jara is a 64 y.o. male new patient kindly referred by RALPH Dowling who presents for renal mass.    HPI  The patient is accompanied by his wife, Lorelei.   Patient was having back and abdominal pain that led to vomiting and fatigue. He had also had some groin pain. Imaging led to removal of his gall bladder on August 15. Kidney mass was found incidentally. Patient was told the spot on his liver was not a serious concern.     Patient denies left flank pain and hematuria. Patient did not have an MRI done, just CT scan.     Patient has diabetes for many years. He relates that his blood sugar is generally in control as long as he is vigilant with his diet and medication. Patient also has HTN.     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     CT A/P w contrast  IMPRESSION:  1. Heterogeneous mass in the interpolar portion of the left kidney  measuring 5.5 x 5.3 x 5.7 cm, concerning for renal cell carcinoma.  2. Inferior right hepatic lobe lesion measuring 3.6 x 3.6 cm,  indeterminate on this single portal venous phase examination.  Recommend dedicated CT or MRI hepatic imaging for further evaluation.  3. Cholelithiasis without evidence for cholecystitis.  4. Chronic bilateral pars defects at L5 without spondylolisthesis.    Past Medical History  No past medical history on file.     Surgical History  Past Surgical History:   Procedure Laterality Date    OTHER SURGICAL HISTORY  06/18/2019    Hand surgery         Social History  He reports that he has been smoking cigarettes. He has never used smokeless tobacco. He reports current alcohol use of about 2.0 standard drinks of alcohol per week. He reports that he does not currently use drugs.    Family History  Family History   Problem Relation Name Age of Onset    Cancer Sister         Medications    Current Outpatient Medications:     lisinopril 20 mg tablet, Take 1 tablet (20 mg) by mouth early in the morning.., Disp: ,  Rfl:     metFORMIN (Glucophage) 1,000 mg tablet, Take 1 tablet (1,000 mg) by mouth every 12 hours., Disp: , Rfl:     acarbose (Precose) 50 mg tablet, Take 1 tablet (50 mg) by mouth 2 times a day., Disp: , Rfl:     atorvastatin (Lipitor) 40 mg tablet, Take 1 tablet (40 mg) by mouth once daily., Disp: , Rfl:     HYDROcodone-acetaminophen (Norco) 5-325 mg tablet, Take 1 tablet by mouth every 6 hours if needed for severe pain (7 - 10). (Patient not taking: Reported on 8/29/2024), Disp: 12 tablet, Rfl: 0    lisinopril 10 mg tablet, Take 1 tablet (10 mg) by mouth once daily., Disp: , Rfl:      Allergies  Patient has no known allergies.     Review of Systems  A 12 system review was completed and is negative with the exception of those signs and symptoms noted in the history of present illness.    Objective   Physical Exam  General: in NAD, appears stated age  Head: normocephalic, atraumatic  Neck: supple; trachea is midline  Respiratory: normal effort, no use of accessory muscles  Cardiovascular: no peripheral edema  Abdomen: soft, nondistended, nontender, no rebound or guarding, no organomegaly, no CVA tenderness, no hernia  Lymphatic: no lymphadenopathy noted  Skin: normal turgor, no rashes  Neurologic: grossly intact, oriented to person/place/time  Psychiatric: mode and affect appropriate    Assessment/Plan   Problem List Items Addressed This Visit             ICD-10-CM    Renal mass - Primary N28.89    Relevant Orders    Case Request Operating Room: Nephrectomy Laparoscopy (Completed)     Other Visit Diagnoses         Codes    Left renal mass     N28.89    Relevant Orders    Urine Culture    Basic Metabolic Panel    CBC    Coagulation Screen    ECG 12 lead    Request for Pre-Admission Testing Visit    Liver lesion     K76.9    Relevant Orders    Creatinine          We reviewed the patient's CT imaging together. I was able to show them the renal mass and the surrounding blood vessels. Treatment for this mass is removal.  Unfortunately, the location of the mass makes it necessary to remove the whole kidney. The right kidney function is good. We discussed the risks, benefits, and alternatives to the nephrectomy procedure. We also discussed the postop care and recovery expectations. I explained that maintaining good kidney function in the remaining kidney will be important and DM and HTN will need to be managed well.     It should be >6 weeks after the cholecystectomy. In the interval we should get the MRI completed to follow-up on the liver lesion. Schedule left nephrectomy.       Scribe Attestation  By signing my name below, I, Edie Evans   attest that this documentation has been prepared under the direction and in the presence of Pablo Valdez MD.

## 2024-08-29 NOTE — TELEPHONE ENCOUNTER
Spoke with pt's wife to inform of active order for abdominal MRI and provided number for scheduling. Pt's wife to call Dr. Valdez's office to schedule a follow up once MRI is scheduled. Recent creatinine was WNL on 8/5/24, informed pt's wife that MRI should be done within 55 days of creatinine results. No further questions or concerns voiced.

## 2024-09-12 ENCOUNTER — APPOINTMENT (OUTPATIENT)
Dept: UROLOGY | Facility: CLINIC | Age: 64
End: 2024-09-12
Payer: COMMERCIAL

## 2024-09-18 ENCOUNTER — APPOINTMENT (OUTPATIENT)
Dept: RADIOLOGY | Facility: CLINIC | Age: 64
End: 2024-09-18
Payer: COMMERCIAL

## 2024-09-19 ENCOUNTER — TELEPHONE (OUTPATIENT)
Dept: UROLOGY | Facility: CLINIC | Age: 64
End: 2024-09-19
Payer: COMMERCIAL

## 2024-09-20 ASSESSMENT — DUKE ACTIVITY SCORE INDEX (DASI)
CAN YOU DO LIGHT WORK AROUND THE HOUSE LIKE DUSTING OR WASHING DISHES: YES
CAN YOU PARTICIPATE IN STRENOUS SPORTS LIKE SWIMMING, SINGLES TENNIS, FOOTBALL, BASKETBALL, OR SKIING: YES
CAN YOU DO HEAVY WORK AROUND THE HOUSE LIKE SCRUBBING FLOORS OR LIFTING AND MOVING HEAVY FURNITURE: YES
TOTAL_SCORE: 53.7
CAN YOU PARTICIPATE IN MODERATE RECREATIONAL ACTIVITIES LIKE GOLF, BOWLING, DANCING, DOUBLES TENNIS OR THROWING A BASEBALL OR FOOTBALL: YES
CAN YOU DO YARD WORK LIKE RAKING LEAVES, WEEDING OR PUSHING A MOWER: NO
CAN YOU WALK A BLOCK OR TWO ON LEVEL GROUND: YES
DASI METS SCORE: 9.3
CAN YOU HAVE SEXUAL RELATIONS: YES
CAN YOU TAKE CARE OF YOURSELF (EAT, DRESS, BATHE, OR USE TOILET): YES
CAN YOU CLIMB A FLIGHT OF STAIRS OR WALK UP A HILL: YES
CAN YOU RUN A SHORT DISTANCE: YES
CAN YOU DO MODERATE WORK AROUND THE HOUSE LIKE VACUUMING, SWEEPING FLOORS OR CARRYING GROCERIES: YES
CAN YOU WALK INDOORS, SUCH AS AROUND YOUR HOUSE: YES

## 2024-09-20 ASSESSMENT — ACTIVITIES OF DAILY LIVING (ADL): ADL_SCORE: 0

## 2024-09-20 ASSESSMENT — LIFESTYLE VARIABLES: SMOKING_STATUS: SMOKER

## 2024-09-23 ENCOUNTER — PRE-ADMISSION TESTING (OUTPATIENT)
Dept: PREADMISSION TESTING | Facility: HOSPITAL | Age: 64
End: 2024-09-23
Payer: COMMERCIAL

## 2024-09-23 ENCOUNTER — TELEPHONE (OUTPATIENT)
Dept: UROLOGY | Facility: CLINIC | Age: 64
End: 2024-09-23

## 2024-09-23 ENCOUNTER — LAB (OUTPATIENT)
Dept: LAB | Facility: LAB | Age: 64
End: 2024-09-23
Payer: COMMERCIAL

## 2024-09-23 VITALS
TEMPERATURE: 96.8 F | DIASTOLIC BLOOD PRESSURE: 66 MMHG | RESPIRATION RATE: 16 BRPM | BODY MASS INDEX: 25.12 KG/M2 | HEIGHT: 70 IN | OXYGEN SATURATION: 97 % | HEART RATE: 94 BPM | WEIGHT: 175.49 LBS | SYSTOLIC BLOOD PRESSURE: 138 MMHG

## 2024-09-23 DIAGNOSIS — N28.89 LEFT RENAL MASS: ICD-10-CM

## 2024-09-23 DIAGNOSIS — Z01.818 PREOP EXAMINATION: ICD-10-CM

## 2024-09-23 DIAGNOSIS — Z01.818 PREOP EXAMINATION: Primary | ICD-10-CM

## 2024-09-23 DIAGNOSIS — K76.9 LIVER LESION: ICD-10-CM

## 2024-09-23 LAB
ANION GAP SERPL CALC-SCNC: 13 MMOL/L (ref 10–20)
APPEARANCE UR: CLEAR
APTT PPP: 31 SECONDS (ref 27–38)
BILIRUB UR STRIP.AUTO-MCNC: NEGATIVE MG/DL
BUN SERPL-MCNC: 25 MG/DL (ref 6–23)
CALCIUM SERPL-MCNC: 10.2 MG/DL (ref 8.6–10.3)
CHLORIDE SERPL-SCNC: 99 MMOL/L (ref 98–107)
CO2 SERPL-SCNC: 27 MMOL/L (ref 21–32)
COLOR UR: ABNORMAL
CREAT SERPL-MCNC: 1.05 MG/DL (ref 0.5–1.3)
EGFRCR SERPLBLD CKD-EPI 2021: 80 ML/MIN/1.73M*2
ERYTHROCYTE [DISTWIDTH] IN BLOOD BY AUTOMATED COUNT: 12.8 % (ref 11.5–14.5)
EST. AVERAGE GLUCOSE BLD GHB EST-MCNC: 206 MG/DL
GLUCOSE SERPL-MCNC: 175 MG/DL (ref 74–99)
GLUCOSE UR STRIP.AUTO-MCNC: ABNORMAL MG/DL
HBA1C MFR BLD: 8.8 %
HCT VFR BLD AUTO: 47 % (ref 41–52)
HGB BLD-MCNC: 15.6 G/DL (ref 13.5–17.5)
HOLD SPECIMEN: NORMAL
INR PPP: 0.9 (ref 0.9–1.1)
KETONES UR STRIP.AUTO-MCNC: NEGATIVE MG/DL
LEUKOCYTE ESTERASE UR QL STRIP.AUTO: NEGATIVE
MCH RBC QN AUTO: 29.8 PG (ref 26–34)
MCHC RBC AUTO-ENTMCNC: 33.2 G/DL (ref 32–36)
MCV RBC AUTO: 90 FL (ref 80–100)
NITRITE UR QL STRIP.AUTO: NEGATIVE
NRBC BLD-RTO: 0 /100 WBCS (ref 0–0)
PH UR STRIP.AUTO: 5.5 [PH]
PLATELET # BLD AUTO: 350 X10*3/UL (ref 150–450)
POTASSIUM SERPL-SCNC: 4.6 MMOL/L (ref 3.5–5.3)
PROT UR STRIP.AUTO-MCNC: NEGATIVE MG/DL
PROTHROMBIN TIME: 10.3 SECONDS (ref 9.8–12.8)
RBC # BLD AUTO: 5.24 X10*6/UL (ref 4.5–5.9)
RBC # UR STRIP.AUTO: NEGATIVE /UL
SODIUM SERPL-SCNC: 134 MMOL/L (ref 136–145)
SP GR UR STRIP.AUTO: 1.02
UROBILINOGEN UR STRIP.AUTO-MCNC: NORMAL MG/DL
WBC # BLD AUTO: 12.1 X10*3/UL (ref 4.4–11.3)

## 2024-09-23 PROCEDURE — 85610 PROTHROMBIN TIME: CPT

## 2024-09-23 PROCEDURE — 99202 OFFICE O/P NEW SF 15 MIN: CPT

## 2024-09-23 PROCEDURE — 80048 BASIC METABOLIC PNL TOTAL CA: CPT

## 2024-09-23 PROCEDURE — 85027 COMPLETE CBC AUTOMATED: CPT

## 2024-09-23 PROCEDURE — 36415 COLL VENOUS BLD VENIPUNCTURE: CPT

## 2024-09-23 PROCEDURE — 85730 THROMBOPLASTIN TIME PARTIAL: CPT

## 2024-09-23 PROCEDURE — 81003 URINALYSIS AUTO W/O SCOPE: CPT

## 2024-09-23 PROCEDURE — 83036 HEMOGLOBIN GLYCOSYLATED A1C: CPT

## 2024-09-23 ASSESSMENT — PAIN - FUNCTIONAL ASSESSMENT: PAIN_FUNCTIONAL_ASSESSMENT: 0-10

## 2024-09-23 NOTE — PREPROCEDURE INSTRUCTIONS
Thank you for visiting Preadmission Testing at [facility]. If you have any changes to your health condition, please call the SURGEON's office to alert them and give them details of your symptoms.        Preoperative Brain Exercises    What are brain exercises?  A brain exercise is any activity that engages your thinking (cognitive) skills.    What types of activities are considered brain exercises?  Jigsaw puzzles, crossword puzzles, word jumble, memory games, word search, and many more.  Many can be found free online or on your phone via a mobile jesse.    Why should I do brain exercises before my surgery?  More recent research has shown brain exercise before surgery can lower the risk of postoperative delirium (confusion) which can be especially important for older adults.  Patients who did brain exercises for 5 to 10 hours the days before surgery, cut their risk of postoperative delirium in half up to 1 week after surgery.      Preoperative Deep Breathing Exercises    Why it is important to do deep breathing exercises before my surgery?  Deep breathing exercises strengthen your breathing muscles.  This helps you to recover after your surgery and decreases the chance of breathing complications.    How are the deep breathing exercises done?  Sit straight with your back supported.  Breathe in deeply and slowly through your nose. Your lower rib cage should expand and your abdomen may move forward.  Hold that breath for 3 to 5 seconds.  Breathe out through pursed lips, slowly and completely.  Rest and repeat 10 times every hour while awake.  Rest longer if you become dizzy or lightheaded.      Patient and Family Education   Ways You Can Help Prevent Blood Clots     This handout explains some simple things you can do to help prevent blood clots.      Blood clots are blockages that can form in the body's veins. When a blood clot forms in your deep veins, it may be called a deep vein thrombosis, or DVT for short. Blood clots can  happen in any part of the body where blood flows, but they are most common in the arms and legs. If a piece of a blood clot breaks free and travels to the lungs, it is called a pulmonary embolus (PE). A PE can be a very serious problem.      Being in the hospital or having surgery can raise your chances of getting a blood clot because you may not be well enough to move around as much as you normally do.      Ways you can help prevent blood clots in the hospital         Wearing SCDs. SCDs stands for Sequential Compression Devices.   SCDs are special sleeves that wrap around your legs  They attach to a pump that fills them with air to gently squeeze your legs every few minutes.   This helps return the blood in your legs to your heart.   SCDs should only be taken off when walking or bathing.   SCDs may not be comfortable, but they can help save your life.               Wearing compression stockings - if your doctor orders them. These special snug fitting stockings gently squeeze your legs to help blood flow.       Walking. Walking helps move the blood in your legs.   If your doctor says it is ok, try walking the halls at least   5 times a day. Ask us to help you get up, so you don't fall.      Taking any blood thinning medicines your doctor orders.          ©[facility]; 3/23        Ways you can help prevent blood clots at home       Wearing compression stockings - if your doctor orders them. ? Walking - to help move the blood in your legs.       Taking any blood thinning medicines your doctor orders.      Signs of a blood clot or PE      Tell your doctor or nurse know right away if you have of the problems listed below.    If you are at home, seek medical care right away. Call 911 for chest pain or problems breathing.          Signs of a blood clot (DVT) - such as pain,  swelling, redness or warmth in your arm or leg      Signs of a pulmonary embolism (PE) - such as chest     pain or feeling short of breath

## 2024-09-23 NOTE — PREPROCEDURE INSTRUCTIONS
Medication List            Accurate as of September 23, 2024  9:56 AM. Always use your most recent med list.                HYDROcodone-acetaminophen 5-325 mg tablet  Commonly known as: Norco  Take 1 tablet by mouth every 6 hours if needed for severe pain (7 - 10).  Medication Adjustments for Surgery: Take/Use as prescribed     lisinopril 20 mg tablet  Additional Medication Adjustments for Surgery: Other (Comment)  Notes to patient: HOLD any evening dose the night before the day of surgery  HOLD the day of surgery     metFORMIN 1,000 mg tablet  Commonly known as: Glucophage  Medication Adjustments for Surgery: Take last dose 1 day (24 hours) before surgery                                  PRE-OPERATIVE INSTRUCTIONS    You will receive notification one business day prior to your procedure to confirm your arrival time. It is important that you answer your phone and/or check your messages during this time. If you do not hear from the surgery center by 5 pm. the day before your procedure, please call [phone].     Please enter the building through the Outpatient entrance and take the elevator off the lobby to the 2nd floor then check in at the Outpatient Surgery desk on the 2nd floor.    INSTRUCTIONS:  Talk to your surgeon for instructions if you should stop your aspirin, blood thinner, or diabetes medicines.  DO NOT take any multivitamins or over the counter supplements for 7-10 days before surgery.  If not being admitted, you must have an adult immediately available to drive you home after surgery. We also highly recommend you have someone stay with you for the entire day and night of your surgery.  For children having surgery, a parent or legal guardian must accompany them to the surgery center. If this is not possible, please call [phone] to make additional arrangements.  For adults who are unable to consent or make medical decisions for themselves, a legal guardian or Power of  must accompany  them to the surgery center. If this is not possible, please call [phone] to make additional arrangements.  Wear comfortable, loose fitting clothing.  All jewelry and piercings must be removed. If you are unable to remove an item or have a dermal piercing, please be sure to tell the nurse when you arrive for surgery.  Nail polish and make-up must be removed.  Avoid smoking or consuming alcohol for 24 hours before surgery.  To help prevent infection, please take a shower/bath and wash your hair the night before and/or morning of surgery (or follow other specific bathing instructions provided).    Preoperative Fasting Guidelines    Why must I stop eating and drinking near surgery time?  With sedation, food or liquid in your stomach can enter your lungs causing serious complications  Increases nausea and vomiting    When do I need to stop eating and drinking before my surgery?  Do not eat any solid food after midnight the night before your surgery/procedure unless otherwise instructed by your surgeon.   You may have up to 13.5 ounces of clear liquid until TWO hours before your instructed arrival time to the hospital.  This includes water, black tea/coffee, (no milk or cream) apple juice, and electrolyte drinks (Gatorade).   You may chew gum until TWO hours before your surgery/procedure      If applicable, notify your surgeons office immediately of any new skin changes that occur to the surgical limb.      If you have any questions or concerns, please call Pre-Admission Testing at [phone].     Home Preoperative Antibacterial Shower with Chlorhexidine gluconate (CHG)     What is a home preoperative antibacterial shower?  This shower is a way of cleaning the skin with a germ killing solution before surgery. The solution contains chlorhexidine gluconate, commonly known as CHG. CHG is a skin cleanser with germ killing ability. Let your doctor know if you are allergic to chlorhexidine.    Why do I need to take a  preoperative antibacterial shower?  Skin is not sterile. It is best to try to make your skin as free of germs as possible before surgery. Proper cleansing with a germ killing soap before surgery can lower the number of germs on your skin. This helps to reduce the risk of infection at the surgical site. Following the instructions listed below will help you prepare your skin for surgery.    How do I use the solution?  Begin using your CHG soap the night before and again the morning of your procedure.   Do not shave the day before or day of surgery.  Remove all jewelry until after surgery. Take off rings and take out all body-piercing jewelry.  Wash your face and hair with normal soap and shampoo before you use the CHG soap.  Apply the CHG solution to a clean wet washcloth. Move away from the water to avoid premature rinsing of the CHG soap as you are applying. Firmly lather your entire body from the neck down. Do not use CHG on your face, eyes, ears, or genitals.   Pay special attention to the area where your incisions will be located.  Do not scrub your skin too hard.  It is important to allow the CHG soap to sit on your skin for 3-5 minutes.  Rinse the solution off your body completely. Do not wash with your normal soap after the CHG soap solution.  Pat yourself dry with a clean, soft towel.  Do not apply powders, lotions or deodorants as these might block how the CHG soap works.   Dress in clean clothing.  Be sure to sleep with clean, freshly laundered sheets.  Be aware that CHG can cause stains on fabric. Rinse your washcloth and other linens that have contact with CHG completely. Use only non-chlorine detergents to launder the items used.

## 2024-09-23 NOTE — TELEPHONE ENCOUNTER
Spoke with wife and patient to notify we have not received any paperwork. [phone] fax number provided. Requesting [facility] paperwork be filled out excusing patient from work at this time. Advised paperwork is completed once surgery is performed.

## 2024-09-23 NOTE — H&P (VIEW-ONLY)
CPM/PAT Evaluation       Name: Rm Jara (Rm Jara)  /Age: 1960/64 y.o.     In-Person       Chief Complaint: Left renal mass     HPI  Pleasant 65 y/o male presents with left renal mass. This was an incidental finding.  Endorses occasional flank pain and nocturia.  Denies hematuria, dysuria, or any other urinary symptoms. Denies recent fever, chills, illness.       Past Medical History:   Diagnosis Date    Arthritis     HL (hearing loss)     Hyperlipidemia     Hypertension     Type 2 diabetes mellitus (Multi)        Past Surgical History:   Procedure Laterality Date    CHOLECYSTECTOMY      COLONOSCOPY      OTHER SURGICAL HISTORY  2019    Hand surgery       Patient  reports that he is not currently sexually active.    Family History   Problem Relation Name Age of Onset    Cancer Sister         No Known Allergies    Prior to Admission medications    Medication Sig Start Date End Date Taking? Authorizing Provider   acarbose (Precose) 50 mg tablet Take 1 tablet (50 mg) by mouth 2 times a day. 19   Historical Provider, MD   atorvastatin (Lipitor) 40 mg tablet Take 1 tablet (40 mg) by mouth once daily. 19   Historical Provider, MD   HYDROcodone-acetaminophen (Norco) 5-325 mg tablet Take 1 tablet by mouth every 6 hours if needed for severe pain (7 - 10).  Patient not taking: Reported on 2024   Shilpi Dowling, CECILLE-CNP   lisinopril 10 mg tablet Take 1 tablet (10 mg) by mouth once daily. 19   Historical Provider, MD   lisinopril 20 mg tablet Take 1 tablet (20 mg) by mouth early in the morning.. 7/10/24   Historical Provider, MD   metFORMIN (Glucophage) 1,000 mg tablet Take 1 tablet (1,000 mg) by mouth every 12 hours. 19   Historical Provider, MD      Constitutional: Negative for fever, chills, or sweats   ENMT: Negative for nasal discharge, congestion, ear pain, mouth pain, throat pain. Positive for complete dentures.   Respiratory: Negative for cough, wheezing,  shortness of breath   Cardiac: Negative for chest pain, dyspnea on exertion, palpitations   Gastrointestinal: Negative for nausea, vomiting, diarrhea, constipation, abdominal pain  Genitourinary: Negative for dysuria, flank pain, frequency, hematuria. See HPI.  Musculoskeletal: Negative for decreased ROM, pain, swelling, weakness.  Positive for right shoulder pain.    Neurological: Negative for dizziness, confusion, headache  Psychiatric: Negative for mood changes   Skin: Negative for itching, rash, ulcer    Hematologic/Lymph: Negative for bruising, easy bleeding  Allergic/Immunologic: Negative itching, sneezing, swelling      Physical Exam  Vitals reviewed.   Constitutional:       Appearance: Normal appearance.   HENT:      Head: Normocephalic.      Mouth/Throat:      Mouth: Mucous membranes are moist.      Pharynx: Oropharynx is clear.   Eyes:      Pupils: Pupils are equal, round, and reactive to light.   Cardiovascular:      Rate and Rhythm: Normal rate and regular rhythm.      Heart sounds: Normal heart sounds.   Pulmonary:      Effort: Pulmonary effort is normal.      Breath sounds: Normal breath sounds.   Abdominal:      General: Bowel sounds are normal.      Palpations: Abdomen is soft.   Musculoskeletal:         General: Normal range of motion.      Cervical back: Normal range of motion.   Skin:     General: Skin is warm and dry.   Neurological:      General: No focal deficit present.      Mental Status: He is alert and oriented to person, place, and time.   Psychiatric:         Mood and Affect: Mood normal.         Behavior: Behavior normal.          PAT AIRWAY:   Airway:     Mallampati::  II    Neck ROM::  Full  normal        Visit Vitals  /66   Pulse 94   Temp 36 °C (96.8 °F) (Temporal)   Resp 16       DASI Risk Score      Flowsheet Row Most Recent Value   DASI SCORE 53.7   METS Score (Will be calculated only when all the questions are answered) 9.3          Caprini DVT Assessment      Flowsheet Row  Most Recent Value   DVT Score 6   Current Status Major surgery planned, including arthroscopic and laproscopic (1-2 hours)   History Prior major surgery   Age 60-75 years   BMI 30 or less          Modified Frailty Index      Flowsheet Row Most Recent Value   Modified Frailty Index Calculator .1818          CHADS2 Stroke Risk         N/A 3 to 100%: High Risk   2 to < 3%: Medium Risk   0 to < 2%: Low Risk     Last Change: N/A          This score determines the patient's risk of having a stroke if the patient has atrial fibrillation.        This score is not applicable to this patient. Components are not calculated.          Revised Cardiac Risk Index      Flowsheet Row Most Recent Value   Revised Cardiac Risk Calculator 0          Apfel Simplified Score      Flowsheet Row Most Recent Value   Apfel Simplified Score Calculator 1          Risk Analysis Index Results This Encounter         9/20/2024  1246             NOWAK Cancer History: Patient does not indicate history of cancer          Stop Bang Score      Flowsheet Row Most Recent Value   Do you snore loudly? 0   Do you often feel tired or fatigued after your sleep? 0   Has anyone ever observed you stop breathing in your sleep? 0   Do you have or are you being treated for high blood pressure? 0   Recent BMI (Calculated) 26.1   Is BMI greater than 35 kg/m2? 0=No   Age older than 50 years old? 1=Yes   Is your neck circumference greater than 17 inches (Male) or 16 inches (Female)? 0   Gender - Male 1=Yes   STOP-BANG Total Score 2            Assessment and Plan:     Assessment and Plan:     Preop:   OR with Dr. Valdez on 10/7 for nephrectomy laparoscopically-left  Labs ordered per Dr. Valdez.   EKG on file from 8/5/2024.  Normal sinus rhythm.  Routed to Dr. Valdez for review.     Neurologic:   The patient is at an increased risk for post operative delirium secondary to type and duration of surgery . Preoperative brain exercise educational handout provided to  patient.    Cardiac:  Hypertension: Controlled with medical management   Hyperlipidemia: Stopped taking statin. Instructed patient to continue statin unless his primary care doctor tells him otherwise.   Duke Activity Status Index (DASI)  DASI Score: 53.7   MET Score: 9.3  RCRI 0, 3.9% risk for postoperative MACE    Pulmonary:   STOP-BANG score of 2. Low risk of obstructive sleep apnea.     Endocrine:  Diabetes mellitus: On metformin. Was taking Ozempic but stopped after gallbladder surgery.  A1c ordered today.  Has been adjusting diet/exercise.   Hemoglobin A1C   Date Value Ref Range Status   08/02/2024 7.9 (H) see below % Final     Hemoglobin A1C   Date Value Ref Range Status   09/23/2024 8.8 (H) See comment % Final   Routed Ha1c to PCP and Dr. Valdez      /Renal:   Left renal mass: Reason for upcoming surgery.    Neuro-muscular:   Right shoulder pain: Recent cortisone injection.     Hematologic:   Caprini score 6, patient at high risk for perioperative DVT. Patient provided with VTE education/handout.     Skin check: Patient was instructed to make surgeon aware of any skin changes/concerns prior to surgery.     Anesthesia: No history of anesthesia complications. No anesthesia concerns.      *See risk scores as previously documented

## 2024-09-23 NOTE — TELEPHONE ENCOUNTER
Wife calling to see if you got thew [facility] paperwork and if so is it filled out?  Please call her

## 2024-09-24 ENCOUNTER — APPOINTMENT (OUTPATIENT)
Dept: UROLOGY | Facility: CLINIC | Age: 64
End: 2024-09-24
Payer: COMMERCIAL

## 2024-09-24 NOTE — TELEPHONE ENCOUNTER
Patient calling again about paperwork and also was just on FMLA from another surgery and ? That time is up went to preop and wants that time on his fmla form.  They state they need asap

## 2024-09-24 NOTE — TELEPHONE ENCOUNTER
I spoke with pt and his wife yesterday, verified the correct fax number. I still have not received the paperwork, his employer might not send it until closer to his surgery on 10/7. I also advised them I cannot send his paperwork until he has the surgery. If he is continuing with pain, he needs to reach out to his prior surgeon regarding his prior surgery.

## 2024-09-26 ENCOUNTER — HOSPITAL ENCOUNTER (OUTPATIENT)
Dept: MRI IMAGING | Age: 64
Discharge: HOME | End: 2024-09-28
Payer: COMMERCIAL

## 2024-09-26 DIAGNOSIS — N28.89 LEFT RENAL MASS: ICD-10-CM

## 2024-09-26 DIAGNOSIS — K76.9 LIVER LESION: ICD-10-CM

## 2024-09-26 PROCEDURE — 74183 MRI ABD W/O CNTR FLWD CNTR: CPT

## 2024-09-26 PROCEDURE — 6360000004 HC RX CONTRAST MEDICATION: Performed by: FAMILY MEDICINE

## 2024-09-26 PROCEDURE — A9577 INJ MULTIHANCE: HCPCS | Performed by: FAMILY MEDICINE

## 2024-09-26 RX ADMIN — GADOBENATE DIMEGLUMINE 20 ML: 529 INJECTION, SOLUTION INTRAVENOUS at 09:01

## 2024-10-03 ENCOUNTER — APPOINTMENT (OUTPATIENT)
Dept: UROLOGY | Facility: CLINIC | Age: 64
End: 2024-10-03
Payer: COMMERCIAL

## 2024-10-03 ENCOUNTER — OFFICE VISIT (OUTPATIENT)
Dept: UROLOGY | Facility: CLINIC | Age: 64
End: 2024-10-03
Payer: COMMERCIAL

## 2024-10-03 VITALS
WEIGHT: 177 LBS | TEMPERATURE: 97.6 F | DIASTOLIC BLOOD PRESSURE: 81 MMHG | HEART RATE: 115 BPM | SYSTOLIC BLOOD PRESSURE: 138 MMHG | BODY MASS INDEX: 25.76 KG/M2

## 2024-10-03 DIAGNOSIS — N28.89 LEFT RENAL MASS: Primary | ICD-10-CM

## 2024-10-03 PROCEDURE — 3052F HG A1C>EQUAL 8.0%<EQUAL 9.0%: CPT | Performed by: UROLOGY

## 2024-10-03 PROCEDURE — 3061F NEG MICROALBUMINURIA REV: CPT | Performed by: UROLOGY

## 2024-10-03 PROCEDURE — 99214 OFFICE O/P EST MOD 30 MIN: CPT | Performed by: UROLOGY

## 2024-10-03 PROCEDURE — 4010F ACE/ARB THERAPY RXD/TAKEN: CPT | Performed by: UROLOGY

## 2024-10-03 PROCEDURE — 3079F DIAST BP 80-89 MM HG: CPT | Performed by: UROLOGY

## 2024-10-03 PROCEDURE — 3049F LDL-C 100-129 MG/DL: CPT | Performed by: UROLOGY

## 2024-10-03 PROCEDURE — 3075F SYST BP GE 130 - 139MM HG: CPT | Performed by: UROLOGY

## 2024-10-03 RX ORDER — ATORVASTATIN CALCIUM 40 MG/1
40 TABLET, FILM COATED ORAL DAILY
COMMUNITY

## 2024-10-03 RX ORDER — ACARBOSE 25 MG/1
25 TABLET ORAL
COMMUNITY

## 2024-10-03 NOTE — PROGRESS NOTES
Subjective   Patient ID: Rm Jara is a 64 y.o. male who presents for   Chief Complaint   Patient presents with    Renal mass     MRI on 9/26/24   On our last visit 8/29/24 we reviewed the patient's CT imaging together. I was able to show them the renal mass and the surrounding blood vessels. Treatment for this mass is removal. Unfortunately, the location of the mass makes it necessary to remove the whole kidney. The right kidney function is good. We discussed the risks, benefits, and alternatives to the nephrectomy procedure. We also discussed the postop care and recovery expectations. I explained that maintaining good kidney function in the remaining kidney will be important and DM and HTN will need to be managed well.      It should be >6 weeks after the cholecystectomy. In the interval we should get the MRI completed to follow-up on the liver lesion. Schedule left nephrectomy.     HPI  Doing well, he is accompanied by his wife. Here to review his MRI    9/26/24 MR abd/pelvis  5.5 cm left-sided renal cell carcinoma.  Recommend surgical consultation.     Review of Systems  A 12 system review was completed and is negative with the exception of those signs and symptoms noted in the history of present illness.    Objective   Physical Exam  General: in NAD, appears stated age  Head: normocephalic, atraumatic  Respiratory: normal effort, no use of accessory muscles  Cardiovascular: no edema noted  Skin: normal turgor, no rashes  Neurologic: grossly intact, oriented to person/place/time  Psychiatric: mode and affect appropriate     Assessment/Plan   Problem List Items Addressed This Visit    None    Reviewed his MRI, showing benign hemangiomas on the liver, not concerning. Will continue with the plan for left nephrectomy on 10/7/24. All questions and concerns were answered.      Will follow up 2 weeks after the procedure.     Scribe Attestation  By signing my name below, I, Edie Yarbrough, attest that this  documentation  has been prepared under the direction and in the presence of Pablo Valdez MD.

## 2024-10-07 ENCOUNTER — ANESTHESIA (OUTPATIENT)
Dept: OPERATING ROOM | Facility: HOSPITAL | Age: 64
End: 2024-10-07
Payer: COMMERCIAL

## 2024-10-07 ENCOUNTER — HOSPITAL ENCOUNTER (INPATIENT)
Facility: HOSPITAL | Age: 64
LOS: 1 days | Discharge: HOME | DRG: 660 | End: 2024-10-08
Attending: UROLOGY | Admitting: UROLOGY
Payer: COMMERCIAL

## 2024-10-07 ENCOUNTER — ANESTHESIA EVENT (OUTPATIENT)
Dept: OPERATING ROOM | Facility: HOSPITAL | Age: 64
End: 2024-10-07
Payer: COMMERCIAL

## 2024-10-07 DIAGNOSIS — N28.89 RENAL MASS: Primary | ICD-10-CM

## 2024-10-07 LAB
ABO GROUP (TYPE) IN BLOOD: NORMAL
ANTIBODY SCREEN: NORMAL
GLUCOSE BLD MANUAL STRIP-MCNC: 190 MG/DL (ref 74–99)
GLUCOSE BLD MANUAL STRIP-MCNC: 210 MG/DL (ref 74–99)
GLUCOSE BLD MANUAL STRIP-MCNC: 219 MG/DL (ref 74–99)
RH FACTOR (ANTIGEN D): NORMAL

## 2024-10-07 PROCEDURE — 7100000002 HC RECOVERY ROOM TIME - EACH INCREMENTAL 1 MINUTE: Performed by: UROLOGY

## 2024-10-07 PROCEDURE — 36415 COLL VENOUS BLD VENIPUNCTURE: CPT | Performed by: UROLOGY

## 2024-10-07 PROCEDURE — 2500000004 HC RX 250 GENERAL PHARMACY W/ HCPCS (ALT 636 FOR OP/ED): Mod: JZ | Performed by: UROLOGY

## 2024-10-07 PROCEDURE — 2500000004 HC RX 250 GENERAL PHARMACY W/ HCPCS (ALT 636 FOR OP/ED): Performed by: NURSE ANESTHETIST, CERTIFIED REGISTERED

## 2024-10-07 PROCEDURE — 86901 BLOOD TYPING SEROLOGIC RH(D): CPT | Performed by: UROLOGY

## 2024-10-07 PROCEDURE — 2500000004 HC RX 250 GENERAL PHARMACY W/ HCPCS (ALT 636 FOR OP/ED): Performed by: UROLOGY

## 2024-10-07 PROCEDURE — 2500000004 HC RX 250 GENERAL PHARMACY W/ HCPCS (ALT 636 FOR OP/ED): Performed by: STUDENT IN AN ORGANIZED HEALTH CARE EDUCATION/TRAINING PROGRAM

## 2024-10-07 PROCEDURE — 3600000004 HC OR TIME - INITIAL BASE CHARGE - PROCEDURE LEVEL FOUR: Performed by: UROLOGY

## 2024-10-07 PROCEDURE — 3700000002 HC GENERAL ANESTHESIA TIME - EACH INCREMENTAL 1 MINUTE: Performed by: UROLOGY

## 2024-10-07 PROCEDURE — 2500000005 HC RX 250 GENERAL PHARMACY W/O HCPCS: Performed by: NURSE ANESTHETIST, CERTIFIED REGISTERED

## 2024-10-07 PROCEDURE — 50545 LAPARO RADICAL NEPHRECTOMY: CPT | Performed by: UROLOGY

## 2024-10-07 PROCEDURE — A50545 PR LAP, RADICAL NEPHRECTOMY: Performed by: ANESTHESIOLOGY

## 2024-10-07 PROCEDURE — 3600000009 HC OR TIME - EACH INCREMENTAL 1 MINUTE - PROCEDURE LEVEL FOUR: Performed by: UROLOGY

## 2024-10-07 PROCEDURE — 2720000007 HC OR 272 NO HCPCS: Performed by: UROLOGY

## 2024-10-07 PROCEDURE — 96372 THER/PROPH/DIAG INJ SC/IM: CPT | Performed by: UROLOGY

## 2024-10-07 PROCEDURE — A50545 PR LAP, RADICAL NEPHRECTOMY: Performed by: NURSE ANESTHETIST, CERTIFIED REGISTERED

## 2024-10-07 PROCEDURE — 3700000001 HC GENERAL ANESTHESIA TIME - INITIAL BASE CHARGE: Performed by: UROLOGY

## 2024-10-07 PROCEDURE — 7100000001 HC RECOVERY ROOM TIME - INITIAL BASE CHARGE: Performed by: UROLOGY

## 2024-10-07 PROCEDURE — 0TT14ZZ RESECTION OF LEFT KIDNEY, PERCUTANEOUS ENDOSCOPIC APPROACH: ICD-10-PCS | Performed by: UROLOGY

## 2024-10-07 PROCEDURE — 2500000002 HC RX 250 W HCPCS SELF ADMINISTERED DRUGS (ALT 637 FOR MEDICARE OP, ALT 636 FOR OP/ED): Performed by: STUDENT IN AN ORGANIZED HEALTH CARE EDUCATION/TRAINING PROGRAM

## 2024-10-07 PROCEDURE — 2500000001 HC RX 250 WO HCPCS SELF ADMINISTERED DRUGS (ALT 637 FOR MEDICARE OP): Performed by: STUDENT IN AN ORGANIZED HEALTH CARE EDUCATION/TRAINING PROGRAM

## 2024-10-07 PROCEDURE — 82947 ASSAY GLUCOSE BLOOD QUANT: CPT

## 2024-10-07 PROCEDURE — 2500000004 HC RX 250 GENERAL PHARMACY W/ HCPCS (ALT 636 FOR OP/ED): Performed by: ANESTHESIOLOGY

## 2024-10-07 PROCEDURE — 1100000001 HC PRIVATE ROOM DAILY

## 2024-10-07 PROCEDURE — 88307 TISSUE EXAM BY PATHOLOGIST: CPT | Mod: TC,STJLAB | Performed by: UROLOGY

## 2024-10-07 RX ORDER — HYDROMORPHONE HYDROCHLORIDE 0.2 MG/ML
0.2 INJECTION INTRAMUSCULAR; INTRAVENOUS; SUBCUTANEOUS EVERY 5 MIN PRN
Status: DISCONTINUED | OUTPATIENT
Start: 2024-10-07 | End: 2024-10-07 | Stop reason: HOSPADM

## 2024-10-07 RX ORDER — FENTANYL CITRATE 50 UG/ML
INJECTION, SOLUTION INTRAMUSCULAR; INTRAVENOUS AS NEEDED
Status: DISCONTINUED | OUTPATIENT
Start: 2024-10-07 | End: 2024-10-07

## 2024-10-07 RX ORDER — OXYCODONE HYDROCHLORIDE 5 MG/1
5 TABLET ORAL EVERY 4 HOURS PRN
Status: DISCONTINUED | OUTPATIENT
Start: 2024-10-07 | End: 2024-10-07 | Stop reason: HOSPADM

## 2024-10-07 RX ORDER — ACETAMINOPHEN 325 MG/1
975 TABLET ORAL EVERY 6 HOURS
Status: DISCONTINUED | OUTPATIENT
Start: 2024-10-07 | End: 2024-10-08 | Stop reason: HOSPADM

## 2024-10-07 RX ORDER — PROPOFOL 10 MG/ML
INJECTION, EMULSION INTRAVENOUS AS NEEDED
Status: DISCONTINUED | OUTPATIENT
Start: 2024-10-07 | End: 2024-10-07

## 2024-10-07 RX ORDER — ACETAMINOPHEN 325 MG/1
975 TABLET ORAL ONCE
Status: DISCONTINUED | OUTPATIENT
Start: 2024-10-07 | End: 2024-10-07 | Stop reason: HOSPADM

## 2024-10-07 RX ORDER — SODIUM CHLORIDE, SODIUM LACTATE, POTASSIUM CHLORIDE, CALCIUM CHLORIDE 600; 310; 30; 20 MG/100ML; MG/100ML; MG/100ML; MG/100ML
20 INJECTION, SOLUTION INTRAVENOUS CONTINUOUS
Status: DISCONTINUED | OUTPATIENT
Start: 2024-10-07 | End: 2024-10-07 | Stop reason: WASHOUT

## 2024-10-07 RX ORDER — DIPHENHYDRAMINE HYDROCHLORIDE 50 MG/ML
12.5 INJECTION INTRAMUSCULAR; INTRAVENOUS ONCE AS NEEDED
Status: DISCONTINUED | OUTPATIENT
Start: 2024-10-07 | End: 2024-10-07 | Stop reason: HOSPADM

## 2024-10-07 RX ORDER — HYDRALAZINE HYDROCHLORIDE 20 MG/ML
5 INJECTION INTRAMUSCULAR; INTRAVENOUS EVERY 30 MIN PRN
Status: DISCONTINUED | OUTPATIENT
Start: 2024-10-07 | End: 2024-10-07 | Stop reason: HOSPADM

## 2024-10-07 RX ORDER — NALOXONE HYDROCHLORIDE 0.4 MG/ML
0.2 INJECTION, SOLUTION INTRAMUSCULAR; INTRAVENOUS; SUBCUTANEOUS EVERY 5 MIN PRN
Status: DISCONTINUED | OUTPATIENT
Start: 2024-10-07 | End: 2024-10-08 | Stop reason: HOSPADM

## 2024-10-07 RX ORDER — ROCURONIUM BROMIDE 10 MG/ML
INJECTION, SOLUTION INTRAVENOUS AS NEEDED
Status: DISCONTINUED | OUTPATIENT
Start: 2024-10-07 | End: 2024-10-07

## 2024-10-07 RX ORDER — ALBUTEROL SULFATE 0.83 MG/ML
2.5 SOLUTION RESPIRATORY (INHALATION) ONCE AS NEEDED
Status: DISCONTINUED | OUTPATIENT
Start: 2024-10-07 | End: 2024-10-07 | Stop reason: HOSPADM

## 2024-10-07 RX ORDER — POLYETHYLENE GLYCOL 3350 17 G/17G
17 POWDER, FOR SOLUTION ORAL DAILY
Status: DISCONTINUED | OUTPATIENT
Start: 2024-10-07 | End: 2024-10-08 | Stop reason: HOSPADM

## 2024-10-07 RX ORDER — HYDROMORPHONE HYDROCHLORIDE 0.2 MG/ML
0.2 INJECTION INTRAMUSCULAR; INTRAVENOUS; SUBCUTANEOUS EVERY 2 HOUR PRN
Status: DISCONTINUED | OUTPATIENT
Start: 2024-10-07 | End: 2024-10-08 | Stop reason: HOSPADM

## 2024-10-07 RX ORDER — ONDANSETRON HYDROCHLORIDE 2 MG/ML
4 INJECTION, SOLUTION INTRAVENOUS ONCE AS NEEDED
Status: COMPLETED | OUTPATIENT
Start: 2024-10-07 | End: 2024-10-07

## 2024-10-07 RX ORDER — HEPARIN SODIUM 5000 [USP'U]/ML
5000 INJECTION, SOLUTION INTRAVENOUS; SUBCUTANEOUS ONCE
Status: COMPLETED | OUTPATIENT
Start: 2024-10-07 | End: 2024-10-07

## 2024-10-07 RX ORDER — SODIUM CHLORIDE, SODIUM LACTATE, POTASSIUM CHLORIDE, CALCIUM CHLORIDE 600; 310; 30; 20 MG/100ML; MG/100ML; MG/100ML; MG/100ML
100 INJECTION, SOLUTION INTRAVENOUS CONTINUOUS
Status: DISCONTINUED | OUTPATIENT
Start: 2024-10-07 | End: 2024-10-08

## 2024-10-07 RX ORDER — ONDANSETRON 4 MG/1
4 TABLET, ORALLY DISINTEGRATING ORAL EVERY 8 HOURS PRN
Status: DISCONTINUED | OUTPATIENT
Start: 2024-10-07 | End: 2024-10-08 | Stop reason: HOSPADM

## 2024-10-07 RX ORDER — OXYCODONE AND ACETAMINOPHEN 5; 325 MG/1; MG/1
1 TABLET ORAL EVERY 4 HOURS PRN
Status: DISCONTINUED | OUTPATIENT
Start: 2024-10-07 | End: 2024-10-07 | Stop reason: HOSPADM

## 2024-10-07 RX ORDER — OXYCODONE HYDROCHLORIDE 10 MG/1
10 TABLET ORAL EVERY 4 HOURS PRN
Status: DISCONTINUED | OUTPATIENT
Start: 2024-10-07 | End: 2024-10-08 | Stop reason: HOSPADM

## 2024-10-07 RX ORDER — BUPIVACAINE HYDROCHLORIDE 5 MG/ML
INJECTION, SOLUTION EPIDURAL; INTRACAUDAL AS NEEDED
Status: DISCONTINUED | OUTPATIENT
Start: 2024-10-07 | End: 2024-10-07 | Stop reason: HOSPADM

## 2024-10-07 RX ORDER — LABETALOL HYDROCHLORIDE 5 MG/ML
5 INJECTION, SOLUTION INTRAVENOUS ONCE AS NEEDED
Status: DISCONTINUED | OUTPATIENT
Start: 2024-10-07 | End: 2024-10-07 | Stop reason: HOSPADM

## 2024-10-07 RX ORDER — SODIUM CHLORIDE, SODIUM LACTATE, POTASSIUM CHLORIDE, CALCIUM CHLORIDE 600; 310; 30; 20 MG/100ML; MG/100ML; MG/100ML; MG/100ML
100 INJECTION, SOLUTION INTRAVENOUS CONTINUOUS
Status: DISCONTINUED | OUTPATIENT
Start: 2024-10-07 | End: 2024-10-07 | Stop reason: HOSPADM

## 2024-10-07 RX ORDER — MIDAZOLAM HYDROCHLORIDE 1 MG/ML
INJECTION, SOLUTION INTRAMUSCULAR; INTRAVENOUS AS NEEDED
Status: DISCONTINUED | OUTPATIENT
Start: 2024-10-07 | End: 2024-10-07

## 2024-10-07 RX ORDER — LIDOCAINE HYDROCHLORIDE 20 MG/ML
INJECTION, SOLUTION INFILTRATION; PERINEURAL AS NEEDED
Status: DISCONTINUED | OUTPATIENT
Start: 2024-10-07 | End: 2024-10-07

## 2024-10-07 RX ORDER — HEPARIN SODIUM 5000 [USP'U]/ML
5000 INJECTION, SOLUTION INTRAVENOUS; SUBCUTANEOUS EVERY 8 HOURS
Status: DISCONTINUED | OUTPATIENT
Start: 2024-10-07 | End: 2024-10-08 | Stop reason: HOSPADM

## 2024-10-07 RX ORDER — INSULIN LISPRO 100 [IU]/ML
0-5 INJECTION, SOLUTION INTRAVENOUS; SUBCUTANEOUS
Status: DISCONTINUED | OUTPATIENT
Start: 2024-10-07 | End: 2024-10-08 | Stop reason: HOSPADM

## 2024-10-07 RX ORDER — LIDOCAINE HYDROCHLORIDE 10 MG/ML
0.1 INJECTION, SOLUTION INFILTRATION; PERINEURAL ONCE
Status: CANCELLED | OUTPATIENT
Start: 2024-10-07 | End: 2024-10-07

## 2024-10-07 RX ORDER — LIDOCAINE 560 MG/1
1 PATCH PERCUTANEOUS; TOPICAL; TRANSDERMAL DAILY
Status: DISCONTINUED | OUTPATIENT
Start: 2024-10-07 | End: 2024-10-08 | Stop reason: HOSPADM

## 2024-10-07 RX ORDER — PHENYLEPHRINE HCL IN 0.9% NACL 1 MG/10 ML
SYRINGE (ML) INTRAVENOUS AS NEEDED
Status: DISCONTINUED | OUTPATIENT
Start: 2024-10-07 | End: 2024-10-07

## 2024-10-07 RX ORDER — ONDANSETRON HYDROCHLORIDE 2 MG/ML
INJECTION, SOLUTION INTRAVENOUS AS NEEDED
Status: DISCONTINUED | OUTPATIENT
Start: 2024-10-07 | End: 2024-10-07

## 2024-10-07 RX ORDER — CEFAZOLIN SODIUM 2 G/100ML
2 INJECTION, SOLUTION INTRAVENOUS ONCE
Status: COMPLETED | OUTPATIENT
Start: 2024-10-07 | End: 2024-10-07

## 2024-10-07 RX ORDER — HYDRALAZINE HYDROCHLORIDE 20 MG/ML
10 INJECTION INTRAMUSCULAR; INTRAVENOUS EVERY 6 HOURS PRN
Status: DISCONTINUED | OUTPATIENT
Start: 2024-10-07 | End: 2024-10-08 | Stop reason: HOSPADM

## 2024-10-07 RX ORDER — OXYCODONE HYDROCHLORIDE 5 MG/1
5 TABLET ORAL EVERY 6 HOURS PRN
Status: DISCONTINUED | OUTPATIENT
Start: 2024-10-07 | End: 2024-10-08 | Stop reason: HOSPADM

## 2024-10-07 RX ORDER — ONDANSETRON HYDROCHLORIDE 2 MG/ML
4 INJECTION, SOLUTION INTRAVENOUS EVERY 8 HOURS PRN
Status: DISCONTINUED | OUTPATIENT
Start: 2024-10-07 | End: 2024-10-08 | Stop reason: HOSPADM

## 2024-10-07 SDOH — ECONOMIC STABILITY: FOOD INSECURITY: WITHIN THE PAST 12 MONTHS, THE FOOD YOU BOUGHT JUST DIDN'T LAST AND YOU DIDN'T HAVE MONEY TO GET MORE.: NEVER TRUE

## 2024-10-07 SDOH — SOCIAL STABILITY: SOCIAL INSECURITY: WERE YOU ABLE TO COMPLETE ALL THE BEHAVIORAL HEALTH SCREENINGS?: YES

## 2024-10-07 SDOH — SOCIAL STABILITY: SOCIAL INSECURITY: ARE YOU OR HAVE YOU BEEN THREATENED OR ABUSED PHYSICALLY, EMOTIONALLY, OR SEXUALLY BY ANYONE?: NO

## 2024-10-07 SDOH — ECONOMIC STABILITY: INCOME INSECURITY: IN THE PAST 12 MONTHS, HAS THE ELECTRIC, GAS, OIL, OR WATER COMPANY THREATENED TO SHUT OFF SERVICE IN YOUR HOME?: NO

## 2024-10-07 SDOH — ECONOMIC STABILITY: FOOD INSECURITY: WITHIN THE PAST 12 MONTHS, YOU WORRIED THAT YOUR FOOD WOULD RUN OUT BEFORE YOU GOT THE MONEY TO BUY MORE.: NEVER TRUE

## 2024-10-07 SDOH — SOCIAL STABILITY: SOCIAL INSECURITY
WITHIN THE LAST YEAR, HAVE YOU BEEN KICKED, HIT, SLAPPED, OR OTHERWISE PHYSICALLY HURT BY YOUR PARTNER OR EX-PARTNER?: NO

## 2024-10-07 SDOH — SOCIAL STABILITY: SOCIAL INSECURITY: WITHIN THE LAST YEAR, HAVE YOU BEEN AFRAID OF YOUR PARTNER OR EX-PARTNER?: NO

## 2024-10-07 SDOH — ECONOMIC STABILITY: INCOME INSECURITY: IN THE PAST 12 MONTHS HAS THE ELECTRIC, GAS, OIL, OR WATER COMPANY THREATENED TO SHUT OFF SERVICES IN YOUR HOME?: NO

## 2024-10-07 SDOH — ECONOMIC STABILITY: FOOD INSECURITY: WITHIN THE PAST 12 MONTHS, YOU WORRIED THAT YOUR FOOD WOULD RUN OUT BEFORE YOU GOT MONEY TO BUY MORE.: NEVER TRUE

## 2024-10-07 SDOH — SOCIAL STABILITY: SOCIAL INSECURITY: WITHIN THE LAST YEAR, HAVE YOU BEEN HUMILIATED OR EMOTIONALLY ABUSED IN OTHER WAYS BY YOUR PARTNER OR EX-PARTNER?: NO

## 2024-10-07 SDOH — SOCIAL STABILITY: SOCIAL INSECURITY: DO YOU FEEL ANYONE HAS EXPLOITED OR TAKEN ADVANTAGE OF YOU FINANCIALLY OR OF YOUR PERSONAL PROPERTY?: NO

## 2024-10-07 SDOH — SOCIAL STABILITY: SOCIAL INSECURITY: HAVE YOU HAD ANY THOUGHTS OF HARMING ANYONE ELSE?: NO

## 2024-10-07 SDOH — SOCIAL STABILITY: SOCIAL INSECURITY: ARE THERE ANY APPARENT SIGNS OF INJURIES/BEHAVIORS THAT COULD BE RELATED TO ABUSE/NEGLECT?: NO

## 2024-10-07 SDOH — SOCIAL STABILITY: SOCIAL INSECURITY
WITHIN THE LAST YEAR, HAVE YOU BEEN RAPED OR FORCED TO HAVE ANY KIND OF SEXUAL ACTIVITY BY YOUR PARTNER OR EX-PARTNER?: NO

## 2024-10-07 SDOH — SOCIAL STABILITY: SOCIAL INSECURITY: ABUSE: ADULT

## 2024-10-07 SDOH — SOCIAL STABILITY: SOCIAL INSECURITY
WITHIN THE LAST YEAR, HAVE TO BEEN RAPED OR FORCED TO HAVE ANY KIND OF SEXUAL ACTIVITY BY YOUR PARTNER OR EX-PARTNER?: NO

## 2024-10-07 SDOH — SOCIAL STABILITY: SOCIAL INSECURITY: HAS ANYONE EVER THREATENED TO HURT YOUR FAMILY OR YOUR PETS?: NO

## 2024-10-07 SDOH — HEALTH STABILITY: MENTAL HEALTH: CURRENT SMOKER: 0

## 2024-10-07 SDOH — SOCIAL STABILITY: SOCIAL INSECURITY: DOES ANYONE TRY TO KEEP YOU FROM HAVING/CONTACTING OTHER FRIENDS OR DOING THINGS OUTSIDE YOUR HOME?: NO

## 2024-10-07 SDOH — SOCIAL STABILITY: SOCIAL INSECURITY: HAVE YOU HAD THOUGHTS OF HARMING ANYONE ELSE?: NO

## 2024-10-07 SDOH — SOCIAL STABILITY: SOCIAL INSECURITY: DO YOU FEEL UNSAFE GOING BACK TO THE PLACE WHERE YOU ARE LIVING?: NO

## 2024-10-07 ASSESSMENT — PAIN DESCRIPTION - ORIENTATION
ORIENTATION: LEFT

## 2024-10-07 ASSESSMENT — COGNITIVE AND FUNCTIONAL STATUS - GENERAL
DAILY ACTIVITIY SCORE: 24
MOBILITY SCORE: 24
PATIENT BASELINE BEDBOUND: NO
MOBILITY SCORE: 24
DAILY ACTIVITIY SCORE: 24

## 2024-10-07 ASSESSMENT — PAIN SCALES - GENERAL
PAINLEVEL_OUTOF10: 8
PAINLEVEL_OUTOF10: 5 - MODERATE PAIN
PAINLEVEL_OUTOF10: 5 - MODERATE PAIN
PAINLEVEL_OUTOF10: 4
PAINLEVEL_OUTOF10: 7
PAINLEVEL_OUTOF10: 5 - MODERATE PAIN
PAINLEVEL_OUTOF10: 0 - NO PAIN
PAINLEVEL_OUTOF10: 7
PAINLEVEL_OUTOF10: 3
PAINLEVEL_OUTOF10: 4
PAINLEVEL_OUTOF10: 7
PAINLEVEL_OUTOF10: 5 - MODERATE PAIN

## 2024-10-07 ASSESSMENT — ACTIVITIES OF DAILY LIVING (ADL)
HEARING - LEFT EAR: FUNCTIONAL
LACK_OF_TRANSPORTATION: NO
DRESSING YOURSELF: INDEPENDENT
ADEQUATE_TO_COMPLETE_ADL: YES
BATHING: INDEPENDENT
JUDGMENT_ADEQUATE_SAFELY_COMPLETE_DAILY_ACTIVITIES: YES
PATIENT'S MEMORY ADEQUATE TO SAFELY COMPLETE DAILY ACTIVITIES?: YES
FEEDING YOURSELF: INDEPENDENT
WALKS IN HOME: INDEPENDENT
HEARING - RIGHT EAR: FUNCTIONAL
TOILETING: INDEPENDENT
GROOMING: INDEPENDENT

## 2024-10-07 ASSESSMENT — LIFESTYLE VARIABLES
AUDIT-C TOTAL SCORE: 0
HOW OFTEN DO YOU HAVE 6 OR MORE DRINKS ON ONE OCCASION: NEVER
HOW MANY STANDARD DRINKS CONTAINING ALCOHOL DO YOU HAVE ON A TYPICAL DAY: PATIENT DOES NOT DRINK
AUDIT-C TOTAL SCORE: 0
HOW OFTEN DO YOU HAVE A DRINK CONTAINING ALCOHOL: NEVER
SKIP TO QUESTIONS 9-10: 1

## 2024-10-07 ASSESSMENT — PATIENT HEALTH QUESTIONNAIRE - PHQ9
SUM OF ALL RESPONSES TO PHQ9 QUESTIONS 1 & 2: 0
1. LITTLE INTEREST OR PLEASURE IN DOING THINGS: NOT AT ALL
2. FEELING DOWN, DEPRESSED OR HOPELESS: NOT AT ALL

## 2024-10-07 ASSESSMENT — PAIN - FUNCTIONAL ASSESSMENT
PAIN_FUNCTIONAL_ASSESSMENT: 0-10

## 2024-10-07 ASSESSMENT — PAIN SCALES - PAIN ASSESSMENT IN ADVANCED DEMENTIA (PAINAD)
TOTALSCORE: MEDICATION (SEE MAR)

## 2024-10-07 ASSESSMENT — PAIN DESCRIPTION - LOCATION
LOCATION: ABDOMEN

## 2024-10-07 ASSESSMENT — PAIN DESCRIPTION - DESCRIPTORS: DESCRIPTORS: SHARP;SORE;TENDER

## 2024-10-07 NOTE — OP NOTE
Date: 10/7/2024  OR Location: [facility] OR    Name: Rm Jara, : 1960, Age: 64 y.o., MRN: [id number], Sex: male    Diagnosis  Pre-op Diagnosis      * Renal mass [N28.89] Post-op Diagnosis     * Renal mass [N28.89]     Procedures  1.  Laparoscopic left radical nephrectomy    Surgeons      * Pablo Valdez - Primary    Resident/Fellow/Other Assistant:  Surgeons and Role:     * Adam Nguyen MD - Assisting    Procedure Summary  Anesthesia: General  ASA: III  Anesthesia Staff:   Anesthesiologist: Devin Smith MD  CRNA: CECILLE Hammond-CRNA  Estimated Blood Loss: 50 cc  Intra-op Medications:   Medication Name Total Dose   sodium chloride 0.9 % irrigation solution 3,000 mL   ceFAZolin in dextrose (iso-os) (Ancef) IVPB 2 g 2 g              Anesthesia Record               Intraprocedure I/O Totals       None           Specimen:   Order Name Source Comment Collection Info Order Time   TYPE AND SCREEN Blood, Venous As needed for scheduled for aortic, liver, cardiac, or thoracic surgery OR hgb<7.5mg/dl and no active type and screen. Collected By: WHIT Bass 10/7/2024  8:38 AM     Release result to [facility]   Immediate        SURGICAL PATHOLOGY EXAM KIDNEY RADICAL NEPHRECTOMY  LEFT Pre-op diagnosis:  Renal mass [N28.89] Collected By: Pablo Valdez MD 10/7/2024 11:44 AM       Staff:   Circulator: Anabell Reza RN  Relief Circulator: Nerissa Angulo RN  Relief Scrub: Anabell Reza RN  Scrub Person: Mohini Gaitan         Drains and/or Catheters:   Urethral Catheter Non-latex 16 Fr. (Active)   Site Assessment Clean;Skin intact 10/07/24 1314   Collection Container Standard drainage bag 10/07/24 1314   Securement Method Securing device (Describe) 10/07/24 1314       Tourniquet Times:         Implants:     Findings: No evidence of disease outside of kidney    Indications: Rm Jara is an 64 y.o. male who is having surgery for Renal mass [N28.89].  Patient underwent imaging for  abdominal pain.  He was incidentally found to have a left renal mass and presents today for laparoscopic nephrectomy.  The risk, benefits, and alternatives were discussed with the patient.  Informed consent was obtained.    Procedure Details: Patient was brought to the operating room and placed in the supine position.  General anesthesia was induced.  Once anesthetized, a Parsons catheter was placed and he was positioned in the flank position with his left side up.  His bony prominences were appropriately padded and he was secured to the surgical table with 3 inch silk tape.  His left flank and abdomen were shaved, prepped, and draped in the usual sterile fashion    A small 1.5 cm incision was made in the left abdomen 1 handsbreadth above the umbilicus along the lateral border of the rectus.  We used Bovie electrocautery to divide the subcutaneous tissue and opened the fascia.  Stay sutures using 0 Vicryl were placed in the fascia.  We then sharply opened the peritoneum and inserted a Reed port.  We insufflated the abdomen to 15 mmHg and then inserted the laparoscope.  No adhesions were noted.  Our assistant port standard position under direct vision.  We then mobilized the left kidney exposing the retroperitoneum.  We identified the left ureter and gonadal vein.  We then carried our dissection cephalad until we encountered the renal hilum.  A single renal artery and vein were skeletonized and then divided with the Endo SINA stapler.  The remaining upper and lateral attachments were taken down with the harmonic scalpel.  The ureter was divided with the Endo SINA stapler freeing the specimen.  It was placed in an Endopouch retrieval bag and placed out of harm's way in the left lower quadrant.  We copiously irrigated and inspected the retroperitoneum.  No bleeding was noted.  We then removed our lowermost trocar and made an extraction incision.  Specimen was delivered sent to pathology.  Posterior fascia was closed with  running 0 Vicryl.  Anterior fascia was closed with interrupted #1 Vicryl.  Pneumoperitoneum was reestablished and we reinspected the retroperitoneum.  No bleeding was noted.  Remaining trocars were removed under direct vision.  Fascia was closed with 0 Vicryl.  Skin was closed with 4-0 Vicryl and Dermabond.  Dry sterile dressing was applied.  Patient was awakened and taken to the PACU in stable condition.    Complications:  None; patient tolerated the procedure well.    Disposition: PACU - hemodynamically stable.  Condition: stable     Pablo Valdez  Phone Number: [phone]

## 2024-10-07 NOTE — ANESTHESIA PROCEDURE NOTES
Airway  Date/Time: 10/7/2024 10:29 AM  Urgency: elective    Airway not difficult    Staffing  Performed: CRNA   Authorized by: Devin Smith MD    Performed by: CECILLE Hammond-UQENTIN  Patient location during procedure: OR    Indications and Patient Condition  Indications for airway management: anesthesia and airway protection  Spontaneous ventilation: present  Sedation level: deep  Preoxygenated: yes  Patient position: sniffing  MILS maintained throughout  Mask difficulty assessment: 2 - vent by mask + OA or adjuvant +/- NMBA  No planned trial extubation    Final Airway Details  Final airway type: endotracheal airway      Successful airway: ETT  Cuffed: yes   Successful intubation technique: direct laryngoscopy  Facilitating devices/methods: intubating stylet  Endotracheal tube insertion site: oral  Blade: Ester  Blade size: #4  ETT size (mm): 7.0  Cormack-Lehane Classification: grade I - full view of glottis  Placement verified by: chest auscultation and capnometry   Measured from: lips  Number of attempts at approach: 1  Ventilation between attempts: none

## 2024-10-07 NOTE — CARE PLAN
The patient's goals for the shift include      The clinical goals for the shift include pain control      Problem: Pain - Adult  Goal: Verbalizes/displays adequate comfort level or baseline comfort level  Outcome: Progressing     Problem: Safety - Adult  Goal: Free from fall injury  Outcome: Progressing     Problem: Discharge Planning  Goal: Discharge to home or other facility with appropriate resources  Outcome: Progressing     Problem: Chronic Conditions and Co-morbidities  Goal: Patient's chronic conditions and co-morbidity symptoms are monitored and maintained or improved  Outcome: Progressing     Problem: Diabetes  Goal: Achieve decreasing blood glucose levels by end of shift  Outcome: Progressing     Problem: Pain  Goal: Takes deep breaths with improved pain control throughout the shift  Outcome: Progressing

## 2024-10-07 NOTE — ANESTHESIA PREPROCEDURE EVALUATION
Patient: Rm Jara    Procedure Information       Date/Time: 10/07/24 1035    Procedure: Nephrectomy Laparoscopy (Left)    Location: [facility] OR 08 / [facility] OR    Surgeons: Pablo Valdez MD            Relevant Problems   Cardiac   (+) Hyperlipidemia       Clinical information reviewed:   Tobacco  Allergies  Meds   Med Hx  Surg Hx   Fam Hx  Soc Hx        NPO Detail:  NPO/Void Status  Carbohydrate Drink Given Prior to Surgery? : N  Date of Last Liquid: 10/06/24  Time of Last Liquid: 2230  Date of Last Solid: 10/06/24  Time of Last Solid: 2230  Last Intake Type: Clear fluids  Time of Last Void: 0800         Physical Exam    Airway  Mallampati: II  TM distance: >3 FB  Neck ROM: full     Cardiovascular   Rhythm: regular  Rate: normal     Dental   (+) upper dentures, lower dentures     Pulmonary   Breath sounds clear to auscultation     Abdominal            Anesthesia Plan    History of general anesthesia?: yes  History of complications of general anesthesia?: no    ASA 3     general     The patient is not a current smoker.  Education provided regarding risk of obstructive sleep apnea.  intravenous induction   Postoperative administration of opioids is intended.  Anesthetic plan and risks discussed with patient.    Plan discussed with CRNA.

## 2024-10-07 NOTE — ANESTHESIA POSTPROCEDURE EVALUATION
Patient: Rm Jaar    Procedure Summary       Date: 10/07/24 Room / Location: [facility] OR 08 / Virtual [facility] OR    Anesthesia Start: 1017 Anesthesia Stop: 1229    Procedure: Nephrectomy Laparoscopy (Left) Diagnosis:       Renal mass      (Renal mass [N28.89])    Surgeons: Pablo Valdez MD Responsible Provider: Devin Smith MD    Anesthesia Type: general ASA Status: 3            Anesthesia Type: general    Vitals Value Taken Time   /64 10/07/24 1315   Temp 36 °C (96.8 °F) 10/07/24 1315   Pulse 85 10/07/24 1315   Resp 11 10/07/24 1315   SpO2 100 % 10/07/24 1315   Vitals shown include unfiled device data.    Anesthesia Post Evaluation    Patient location during evaluation: PACU  Patient participation: complete - patient participated  Level of consciousness: awake and alert  Pain management: satisfactory to patient  Multimodal analgesia pain management approach  Airway patency: patent  Two or more strategies used to mitigate risk of obstructive sleep apnea  Cardiovascular status: acceptable  Respiratory status: acceptable  Hydration status: acceptable  Postoperative Nausea and Vomiting: none        No notable events documented.

## 2024-10-08 VITALS
TEMPERATURE: 97.7 F | RESPIRATION RATE: 17 BRPM | DIASTOLIC BLOOD PRESSURE: 68 MMHG | SYSTOLIC BLOOD PRESSURE: 126 MMHG | BODY MASS INDEX: 25.05 KG/M2 | HEART RATE: 86 BPM | HEIGHT: 70 IN | WEIGHT: 175 LBS | OXYGEN SATURATION: 98 %

## 2024-10-08 LAB
ANION GAP SERPL CALC-SCNC: 13 MMOL/L (ref 10–20)
BUN SERPL-MCNC: 18 MG/DL (ref 6–23)
CALCIUM SERPL-MCNC: 8.9 MG/DL (ref 8.6–10.3)
CHLORIDE SERPL-SCNC: 102 MMOL/L (ref 98–107)
CO2 SERPL-SCNC: 25 MMOL/L (ref 21–32)
CREAT SERPL-MCNC: 1.45 MG/DL (ref 0.5–1.3)
EGFRCR SERPLBLD CKD-EPI 2021: 54 ML/MIN/1.73M*2
ERYTHROCYTE [DISTWIDTH] IN BLOOD BY AUTOMATED COUNT: 12.8 % (ref 11.5–14.5)
GLUCOSE BLD MANUAL STRIP-MCNC: 134 MG/DL (ref 74–99)
GLUCOSE BLD MANUAL STRIP-MCNC: 193 MG/DL (ref 74–99)
GLUCOSE SERPL-MCNC: 146 MG/DL (ref 74–99)
HCT VFR BLD AUTO: 37.8 % (ref 41–52)
HGB BLD-MCNC: 12.6 G/DL (ref 13.5–17.5)
MCH RBC QN AUTO: 30 PG (ref 26–34)
MCHC RBC AUTO-ENTMCNC: 33.3 G/DL (ref 32–36)
MCV RBC AUTO: 90 FL (ref 80–100)
NRBC BLD-RTO: 0 /100 WBCS (ref 0–0)
PLATELET # BLD AUTO: 286 X10*3/UL (ref 150–450)
POTASSIUM SERPL-SCNC: 4 MMOL/L (ref 3.5–5.3)
RBC # BLD AUTO: 4.2 X10*6/UL (ref 4.5–5.9)
SODIUM SERPL-SCNC: 136 MMOL/L (ref 136–145)
WBC # BLD AUTO: 11.3 X10*3/UL (ref 4.4–11.3)

## 2024-10-08 PROCEDURE — 82947 ASSAY GLUCOSE BLOOD QUANT: CPT

## 2024-10-08 PROCEDURE — 80048 BASIC METABOLIC PNL TOTAL CA: CPT | Performed by: STUDENT IN AN ORGANIZED HEALTH CARE EDUCATION/TRAINING PROGRAM

## 2024-10-08 PROCEDURE — 85027 COMPLETE CBC AUTOMATED: CPT | Performed by: STUDENT IN AN ORGANIZED HEALTH CARE EDUCATION/TRAINING PROGRAM

## 2024-10-08 PROCEDURE — 36415 COLL VENOUS BLD VENIPUNCTURE: CPT | Performed by: STUDENT IN AN ORGANIZED HEALTH CARE EDUCATION/TRAINING PROGRAM

## 2024-10-08 PROCEDURE — 2500000004 HC RX 250 GENERAL PHARMACY W/ HCPCS (ALT 636 FOR OP/ED): Performed by: STUDENT IN AN ORGANIZED HEALTH CARE EDUCATION/TRAINING PROGRAM

## 2024-10-08 PROCEDURE — 2500000002 HC RX 250 W HCPCS SELF ADMINISTERED DRUGS (ALT 637 FOR MEDICARE OP, ALT 636 FOR OP/ED): Performed by: STUDENT IN AN ORGANIZED HEALTH CARE EDUCATION/TRAINING PROGRAM

## 2024-10-08 PROCEDURE — 2500000005 HC RX 250 GENERAL PHARMACY W/O HCPCS: Performed by: STUDENT IN AN ORGANIZED HEALTH CARE EDUCATION/TRAINING PROGRAM

## 2024-10-08 PROCEDURE — 2500000001 HC RX 250 WO HCPCS SELF ADMINISTERED DRUGS (ALT 637 FOR MEDICARE OP): Performed by: STUDENT IN AN ORGANIZED HEALTH CARE EDUCATION/TRAINING PROGRAM

## 2024-10-08 RX ORDER — ACETAMINOPHEN 325 MG/1
650 TABLET ORAL EVERY 6 HOURS PRN
Start: 2024-10-08

## 2024-10-08 RX ORDER — POLYETHYLENE GLYCOL 3350 17 G/17G
17 POWDER, FOR SOLUTION ORAL DAILY
Qty: 7 PACKET | Refills: 0 | Status: SHIPPED | OUTPATIENT
Start: 2024-10-09 | End: 2024-10-16

## 2024-10-08 RX ORDER — OXYCODONE HYDROCHLORIDE 5 MG/1
5 TABLET ORAL EVERY 6 HOURS PRN
Qty: 8 TABLET | Refills: 0 | Status: SHIPPED | OUTPATIENT
Start: 2024-10-08 | End: 2024-10-10

## 2024-10-08 RX ORDER — DOCUSATE SODIUM 100 MG/1
100 CAPSULE, LIQUID FILLED ORAL 2 TIMES DAILY PRN
Qty: 14 CAPSULE | Refills: 0 | Status: SHIPPED | OUTPATIENT
Start: 2024-10-08 | End: 2024-10-15

## 2024-10-08 ASSESSMENT — PAIN DESCRIPTION - LOCATION
LOCATION: SHOULDER
LOCATION: ABDOMEN
LOCATION: SHOULDER
LOCATION: ABDOMEN

## 2024-10-08 ASSESSMENT — PAIN SCALES - GENERAL
PAINLEVEL_OUTOF10: 8
PAINLEVEL_OUTOF10: 4
PAINLEVEL_OUTOF10: 7
PAINLEVEL_OUTOF10: 5 - MODERATE PAIN
PAINLEVEL_OUTOF10: 7

## 2024-10-08 ASSESSMENT — PAIN DESCRIPTION - ORIENTATION
ORIENTATION: LEFT
ORIENTATION: RIGHT
ORIENTATION: LEFT
ORIENTATION: RIGHT

## 2024-10-08 ASSESSMENT — PAIN SCALES - PAIN ASSESSMENT IN ADVANCED DEMENTIA (PAINAD): TOTALSCORE: MEDICATION (SEE MAR)

## 2024-10-08 ASSESSMENT — PAIN - FUNCTIONAL ASSESSMENT: PAIN_FUNCTIONAL_ASSESSMENT: 0-10

## 2024-10-08 NOTE — DISCHARGE SUMMARY
Discharge Diagnosis  Renal mass    Issues Requiring Follow-Up  Follow-up in 2 weeks for wound check and discussion of pathology    Test Results Pending At Discharge  Pending Labs       Order Current Status    Surgical Pathology Exam In process            Hospital Course   Patient was admitted and underwent laparoscopic left nephrectomy.  Postoperative course was uncomplicated.  By postoperative day #1, he was ambulating, tolerating a regular diet, and his well-controlled with oral analgesics.    Pertinent Physical Exam At Time of Discharge  Physical Exam    Home Medications     Medication List      START taking these medications     acetaminophen 325 mg tablet; Commonly known as: Tylenol; Take 2 tablets   (650 mg) by mouth every 6 hours if needed for mild pain (1 - 3).   docusate sodium 100 mg capsule; Commonly known as: Colace; Take 1   capsule (100 mg) by mouth 2 times a day as needed for constipation for up   to 7 days.   oxyCODONE 5 mg immediate release tablet; Commonly known as: Roxicodone;   Take 1 tablet (5 mg) by mouth every 6 hours if needed for severe pain (7 -   10) for up to 2 days.   polyethylene glycol 17 gram packet; Commonly known as: Glycolax,   Miralax; Take 17 g by mouth once daily for 7 days.; Start taking on:   October 9, 2024     CONTINUE taking these medications     acarbose 25 mg tablet; Commonly known as: Precose   atorvastatin 40 mg tablet; Commonly known as: Lipitor   lisinopril 20 mg tablet   metFORMIN 1,000 mg tablet; Commonly known as: Glucophage     STOP taking these medications     HYDROcodone-acetaminophen 5-325 mg tablet; Commonly known as: Norco       Outpatient Follow-Up  Future Appointments   Date Time Provider Department Center   10/22/2024 10:40 AM Pablo Valdez MD [id number] [place]       Pablo Valdez MD

## 2024-10-08 NOTE — CARE PLAN
The patient's goals for the shift include      The clinical goals for the shift include pt will demonstrate comfort aeb sleeping in intervals of 3 hours or greater by end of this shift    Over the shift, the patient did make  slight progress toward the following goals. Pt is alert and oriented x3 rating pain from 5-8 receiving scheduled tylenol and oxy per pain scale, with fair results pt has been up and ambulating in hallway and has passed flatus which has been helpful with abd discomfort pt reports only sleeping in short intervals d/t unable to sleep on back . Pt has tolerated diet of clear liquids , denies nausea , pt has been able to do exercises, I.s and has been splinting abd as needed, pt has been able to make needs known, using ncl and has remained safe this shift

## 2024-10-08 NOTE — PROGRESS NOTES
Rm LOBO Jara 64 y.o. male    Subjective  Patient seen and examined this morning.  Denies nausea and vomiting.  No fever or chills. Passing some flatus, no BM as of yet.  Up ambulating. Pain controlled.  Parsons catheter with yellow urine.  Denies CP and SOB.  No acute events overnight.     Objective  PHYSICAL EXAM:  Physical Exam  Vitals reviewed.   Constitutional:       General: He is awake.      Appearance: Normal appearance.   Cardiovascular:      Rate and Rhythm: Normal rate and regular rhythm.      Pulses: Normal pulses.      Heart sounds: Normal heart sounds.   Pulmonary:      Effort: Pulmonary effort is normal.      Breath sounds: Normal breath sounds and air entry.   Abdominal:      General: Abdomen is flat. There is no distension.      Palpations: Abdomen is soft.      Tenderness: There is abdominal tenderness. There is no guarding or rebound.      Comments: Soft, minimally distended.  No guarding, rebound or peritoneal signs. Incisions well approximated with glue.  Appropriately TTP.    Genitourinary:     Comments: Parsons catheter with yellow urine.   Musculoskeletal:         General: Normal range of motion.      Cervical back: Normal range of motion.   Skin:     General: Skin is warm and dry.   Neurological:      General: No focal deficit present.      Mental Status: He is alert and oriented to person, place, and time.   Psychiatric:         Behavior: Behavior is cooperative.         Vital signs in last 24 hours:  Vitals:    10/08/24 0725   BP: 115/78   Pulse: 80   Resp: 16   Temp: 36.4 °C (97.5 °F)   SpO2: 98%         Intake/Output this shift:    Intake/Output Summary (Last 24 hours) at 10/8/2024 0953  Last data filed at 10/8/2024 0725  Gross per 24 hour   Intake 460 ml   Output 2825 ml   Net -2365 ml        Allergies:  No Known Allergies     Medications:  Scheduled medications  acetaminophen, 975 mg, oral, q6h  heparin (porcine), 5,000 Units, subcutaneous, q8h  insulin lispro, 0-5 Units, subcutaneous,  TID  lidocaine, 1 patch, transdermal, Daily  polyethylene glycol, 17 g, oral, Daily      Continuous medications     PRN medications  PRN medications: hydrALAZINE, HYDROmorphone, naloxone, ondansetron ODT **OR** ondansetron, oxyCODONE, oxyCODONE       Labs:  Results for orders placed or performed during the hospital encounter of 10/07/24 (from the past 24 hour(s))   POCT GLUCOSE   Result Value Ref Range    POCT Glucose 219 (H) 74 - 99 mg/dL   POCT GLUCOSE   Result Value Ref Range    POCT Glucose 210 (H) 74 - 99 mg/dL   CBC   Result Value Ref Range    WBC 11.3 4.4 - 11.3 x10*3/uL    nRBC 0.0 0.0 - 0.0 /100 WBCs    RBC 4.20 (L) 4.50 - 5.90 x10*6/uL    Hemoglobin 12.6 (L) 13.5 - 17.5 g/dL    Hematocrit 37.8 (L) 41.0 - 52.0 %    MCV 90 80 - 100 fL    MCH 30.0 26.0 - 34.0 pg    MCHC 33.3 32.0 - 36.0 g/dL    RDW 12.8 11.5 - 14.5 %    Platelets 286 150 - 450 x10*3/uL   Basic metabolic panel   Result Value Ref Range    Glucose 146 (H) 74 - 99 mg/dL    Sodium 136 136 - 145 mmol/L    Potassium 4.0 3.5 - 5.3 mmol/L    Chloride 102 98 - 107 mmol/L    Bicarbonate 25 21 - 32 mmol/L    Anion Gap 13 10 - 20 mmol/L    Urea Nitrogen 18 6 - 23 mg/dL    Creatinine 1.45 (H) 0.50 - 1.30 mg/dL    eGFR 54 (L) >60 mL/min/1.73m*2    Calcium 8.9 8.6 - 10.3 mg/dL   POCT GLUCOSE   Result Value Ref Range    POCT Glucose 134 (H) 74 - 99 mg/dL        Imaging:  MR abdomen w and wo IV contrast    Result Date: 9/26/2024  EXAMINATION: MRI OF THE ABDOMEN WITHOUT AND WITH CONTRAST, 9/26/2024 8:43 am TECHNIQUE: Multiplanar multisequence MRI of the abdomen was performed without and with the administration of intravenous contrast. COMPARISON: None. HISTORY: ORDERING SYSTEM PROVIDED HISTORY: Left renal mass, Liver lesion TECHNOLOGIST PROVIDED HISTORY: STAT Creatinine as needed:->No What reading provider will be dictating this exam?->CRC FINDINGS: Portions of the upper abdomen not included in the field of view given limitations of this examination. Liver: The  visualized portion of the liver demonstrates a normal signal intensity and morphology.  3.3 cm T2 hyperintense lesion seen in the right lower liver demonstrating peripheral nodular interrupted enhancement bowel centripetal fill-in consistent benign hepatic hemangioma. Gallbladder: Surgically absent Biliary tree: Unremarkable Pancreas: Demonstrates a normal signal intensity without evidence of ductal dilatation. No discrete lesion.   Spleen: Unremarkable Kidneys and ureters: Large enhancing left renal mass measuring 5.5 x 4.8 cm consistent with renal cell carcinoma.  The renal vein is patent.  Simple appearing right renal cyst. Adrenal glands: Unremarkable Lymph nodes: No enlarged lymph nodes Bowel: The visualized bowel is within normal limits. Bone marrow: Within normal limits Lower chest: Unremarkable Vasculature: Patent portal venous system Miscellaneous: Nothing significant    5.5 cm left-sided renal cell carcinoma.  Recommend surgical consultation. The findings were sent to the Radiology Results Communication Center at 9:23 am on 9/26/2024 to be communicated to a licensed caregiver.           Plan  Renal mass     POD#1:  S/p  Laparoscopic left radical nephrectomy     - surgery as above  - avss  - Diet: diabetic  - Pain control  - Nausea: antiemetics PRN  - Encourage OOB and IS  - Heparin for DVT prophylaxis  - Remove bee catheter  - DC IVF  - Bowel regimen     #Acute blood loss anemia  -  Acute component secondary to surgery and dilutional from IVFB  -  Preop H/H 15.6/47.0  -  H&H 12.6/37.8.  No active signs of bleeding     #Diabetes Mellitus  - Accuchecks with sliding coverage     - Daily labs      Plan of care discussed with Dr. Valdez and patient may discharge home later today.  Home medications and prescriptions as discussed.  Follow up as scheduled.     CECILLE Rothman-CNP    I spent 35 minutes in the professional and overall care of this patient.

## 2024-10-08 NOTE — PROGRESS NOTES
Physical Therapy                       Therapy Communication Note    Patient Name: Rm Jara  MRN: [id number]  Today's Date: 10/8/2024     Discipline: Physical Therapy    Missed Visit Reason: PT order received, chart reviewed. Spoke with RN and pt. Pt has been ambulating independently in hallways. Pt feels that he is near his baseline mobility level and does not have PT needs. Will sign off.     Missed Time: Attempt

## 2024-10-08 NOTE — PROGRESS NOTES
10/08/24 1053   Discharge Planning   Living Arrangements Spouse/significant other   Support Systems Spouse/significant other   Type of Residence Private residence   Home or Post Acute Services None   Expected Discharge Disposition Home     Met with patient at bedside. Admitted for nephrectomy. Pt lives with spouse and was independent PTA with no HHC or DME.PCP is Leslie Flynn. Pt feels he is able to manage his health and understands his medications. Was able to drive and obtain meds. Pt plans to return home with no new discharge needs. Spouse will provide transport.

## 2024-10-08 NOTE — DISCHARGE INSTRUCTIONS
DEPARTMENT OF Urology  DISCHARGE INSTRUCTIONS -- Partial Nephrectomy  Inpatient Surgery    C O N F I D E N T I A L   I N F O R M A T I O N    Rm Jara      Call [phone] during regular daytime business hours (8:00 am - 5:00 pm) and after 5:00 pm ask for the Urology resident with any questions or concerns.    If it is a life-threatening situation, proceed to the nearest emergency department.        Thank you for the opportunity to care for you today.  Your health and healing are very important to us.  We hope we made you feel as comfortable as possible and are committed to your recovery and continued well-being.      The following is a brief overview of your nephrectomy procedure. Some of the information contained on this summary may be confidential.  This information should be kept in your records and should be shared with your regular doctor.    Physicians:   Dr. Valdez    Procedure performed: removal of part of your left kidney  Pending Results: surgical pathology    What to Expect During your Recovery and Home Care  Anesthesia Side Effects   You may feel sleepy, tired, or have a sore throat.   You may also feel drowsiness, dizziness, or inability to think clearly.  For your safety, do not drive, drink alcoholic beverages, take any unprescribed medication or make any important decisions for 24 hours after anesthesia.  A responsible adult should be with you for 24 hours.        Activity and Recovery    No heavy lifting greater than 10 pounds for the next 4 weeks. No driving until mobility has returned to normal. Do not drive or operate heavy machinery while taking narcotic pain medications as these medications can alter perception, impair judgement, and slow reaction times.  Pain Control  Unfortunately, you may experience pain after your procedure. Adequate pain management can include alternative measures to help ease your pain and that can include over the counter Tylenol/ibuprofen or oxycodone  which can be taken as prescribed as needed for breakthrough pain. Do not take more than 4,000mg of Tylenol in a 24-hour period.      If your procedure was done robotically you may experience gas pains from the surgery. Getting up and moving around is the best way to relieve those pains. You can also take some over the counter gas-x(simethicone).    Nausea/Vomiting   Clear liquids are best tolerated at first. Start slow, advance your diet as tolerated to normal foods. Avoid spicy, greasy, heavy foods at first. Also, you may feel nauseous or like you need to vomit if you take any type of medication on an empty stomach.  Call your physician if you are unable to eat or drink, are not passing gas and have persistent vomiting.    Signs of Bleeding   You could have some blood in your urine off and on over the next several weeks. Your urine will be light pink to yellow. Minor bleeding or drainage may occur from the surgical sites; however, excessive or consistent bleeding should be reported to your surgeon. Excessive bleeding is defined as blood that is dripping from wound, soaking you bandages, and is ketchup colored, thick with possible blood clots.  Consistent is defined as bleeding that does not stop.      Treatment/wound care:   Keep area(s) clean and dry.   It is okay to shower 24 hours after time of surgery.    Do not scrub wound(s), pat dry.    Do not submerge wound(s) in standing water until seen for follow up appointment (no tub bathing, swimming, or hot tubs).    Clean with mild soap, gentle washing, pat dry, cover with bandage as needed.    Avoid waterproof bandages.  No oils or lotions on incisions.  If you have staples, they will be removed in our clinic 10-14 days after the date of surgery.  Do not remove the staples on your own.      Please visually inspect your wound(s) at least once daily.  If the wound(s) are in a difficult to see location, please use a mirror or have someone else assist with visual  inspection.    Signs of Infection  Signs of infection can include fever, chills, redness around surgical incisions, green/yellow drainage from incisions, burning sensation with passing of urine, or severe abdominal pain.  If you see any of these occur, please contact your doctor's office at [phone].  Any fever higher than 100.4, especially if associated with an ill feeling, abdominal pain, chills, or nausea should be reported to your surgeon.      Assist in bowel movements/urination  Take daily stool softener you were prescribed  Increase fiber in diet  Increase water (6 to 8 glasses)  Increase walking   Add over the counter MiraLAX while you are taking your post-operative pain medication, or in extreme cases milk of magnesia.  If you have tried these methods and you are not passing gas, your bladder still feels full and you cannot have a bowel movement, please go to your nearest Emergency room/contact your physician.    Additional Instructions:   Use a small pillow to put pressure on your belly. This can make you more comfortable when you cough, laugh or do other actions.

## 2024-10-08 NOTE — CARE PLAN
The patient's goals for the shift include      The clinical goals for the shift include pt will demonstrate comfort aeb sleeping in intervals of 3 hours or greater by end of this shift  Pt met goals for discharge to home today.

## 2024-10-08 NOTE — PROGRESS NOTES
Spiritual Care Visit    Clinical Encounter Type  Visited With: Patient and family together  Routine Visit: Introduction       I was able to visit with patient and wife for considerable time.  We had a wonderful conversation about carlos and healing, and his personal health journey over the last several months.  I asked if he was interested in completing Advance Directives, I explained the purpose of the forms, however, patient has apprehensive and wants to think about it and discuss in detail with his wife.  We discussed the pros and cons of having these forms in place.  Patient and wife were very nice to talk to.  They are grateful for all the care they have received from the medical staff.                        Advance Directives  Advance Directives Reviewed Date: 10/08/24  Advance Directives Reviewed with: Patient, Spouse  Comment: Patient is not ready to complete the forms at this time.

## 2024-10-17 LAB
LAB AP ASR DISCLAIMER: NORMAL
LAB AP BLOCK FOR ADDITIONAL STUDIES: NORMAL
LABORATORY COMMENT REPORT: NORMAL
PATH REPORT.FINAL DX SPEC: NORMAL
PATH REPORT.GROSS SPEC: NORMAL
PATH REPORT.RELEVANT HX SPEC: NORMAL
PATH REPORT.TOTAL CANCER: NORMAL
PATHOLOGY SYNOPTIC REPORT: NORMAL

## 2024-10-18 ENCOUNTER — TELEPHONE (OUTPATIENT)
Dept: UROLOGY | Facility: CLINIC | Age: 64
End: 2024-10-18
Payer: COMMERCIAL

## 2024-10-21 NOTE — TELEPHONE ENCOUNTER
Paperwork faxed on 10/3/24 and scanned in to chart. Faxed again on 10/21/24. Call to notify, message left.

## 2024-10-22 ENCOUNTER — APPOINTMENT (OUTPATIENT)
Dept: UROLOGY | Facility: CLINIC | Age: 64
End: 2024-10-22
Payer: COMMERCIAL

## 2024-10-22 VITALS
BODY MASS INDEX: 26.09 KG/M2 | WEIGHT: 181.8 LBS | DIASTOLIC BLOOD PRESSURE: 78 MMHG | SYSTOLIC BLOOD PRESSURE: 126 MMHG | TEMPERATURE: 97.7 F | HEART RATE: 84 BPM

## 2024-10-22 DIAGNOSIS — C64.2 RENAL CELL CANCER, LEFT (MULTI): Primary | ICD-10-CM

## 2024-10-22 DIAGNOSIS — N28.89 RENAL MASS: ICD-10-CM

## 2024-10-22 PROCEDURE — 4010F ACE/ARB THERAPY RXD/TAKEN: CPT | Performed by: UROLOGY

## 2024-10-22 PROCEDURE — 3074F SYST BP LT 130 MM HG: CPT | Performed by: UROLOGY

## 2024-10-22 PROCEDURE — 99024 POSTOP FOLLOW-UP VISIT: CPT | Performed by: UROLOGY

## 2024-10-22 PROCEDURE — 3061F NEG MICROALBUMINURIA REV: CPT | Performed by: UROLOGY

## 2024-10-22 PROCEDURE — 3078F DIAST BP <80 MM HG: CPT | Performed by: UROLOGY

## 2024-10-22 PROCEDURE — 3052F HG A1C>EQUAL 8.0%<EQUAL 9.0%: CPT | Performed by: UROLOGY

## 2024-10-22 PROCEDURE — 3049F LDL-C 100-129 MG/DL: CPT | Performed by: UROLOGY

## 2024-10-22 NOTE — PROGRESS NOTES
Subjective   Patient ID: Rm Jara is a 64 y.o. male who presents for pathology results. Patient is s/p  left radical nephrectomy on 10/7/24.    HPI  The patient relates that he is recovering well. He is noting he is a little sore but not unexpected. His bowels have just started moving. He has discomfort with bowel movements. He has run out of stool softener.     Pathology Results   FINAL DIAGNOSIS   Kidney, left, radical nephrectomy:  --Renal cell carcinoma, clear-cell type (see note).  --See case summary report.     Note: The tumor cells are immunoreactive for cytokeratins CAM5.2, PAX8 and carbonic anhydrase IX.     Review of Systems  A 12 system review was completed and is negative with the exception of those signs and symptoms noted in the history of present illness.    Objective   Physical Exam  General: in NAD, appears stated age  Head: normocephalic, atraumatic  Respiratory: normal effort, no use of accessory muscles  Cardiovascular: no edema noted  Skin: normal turgor, no rashes  Neurologic: grossly intact, oriented to person/place/time  Psychiatric: mode and affect appropriate     Assessment/Plan   Problem List Items Addressed This Visit             ICD-10-CM    Renal mass N28.89    Relevant Orders    Follow Up In Urology    CT kidney w and wo IV contrast    CBC    Comprehensive Metabolic Panel     Pathology came back as renal cell carcinoma with clean margins. No further treatment is needed. We provided a note for the patient to return to work on 11/18/24. Order CT renal mass, CBC, and CMP in 6 months and follow-up with results.     Scribe Attestation  By signing my name below, I, Ni Hernandez , Edie   attest that this documentation has been prepared under the direction and in the presence of Pablo Valdez MD.

## 2024-11-06 NOTE — DOCUMENTATION CLARIFICATION NOTE
PATIENT:               RITESH SWENSON  ACCT #:                  [id number]  MRN:                       [id number]  :                       1960  ADMIT DATE:       10/7/2024 8:30 AM  DISCH DATE:        10/8/2024 2:59 PM  RESPONDING PROVIDER #:        [id number]          PROVIDER RESPONSE TEXT:    I concur with the pathology report findings and they are clinically significant    CDI QUERY TEXT:    Clarification    Instruction:    Based on your assessment of the patient and the clinical information, please provide the requested documentation by clicking on the appropriate radio button and enter any additional information if prompted.    Question: Please document whether you concur or do not concur with the pathology report findings    When answering this query, please exercise your independent professional judgment. The fact that a question is being asked, does not imply that any particular answer is desired or expected.    The patient's clinical indicators include:  Clinical Information:  64M presents for L renal mass; L nephrectomy    Clinical Indicators and Pathology Findings:  - Path report, 10/7/24: Final Diagnosis: Kidney, left, radical nephrectomy: --Renal cell carcinoma, clear-cell type    Treatment:  L nephrectomy and pathology    Risk Factors:  L renal mass  Options provided:  -- I concur with the pathology report findings and they are clinically significant  -- I do not concur with the pathology report findings  -- Other - I will add my own diagnosis  -- Refer to Clinical Documentation Reviewer    Query created by: Iram Reese on 2024 1:18 PM      Electronically signed by:  KASEY SWARTZ MD 2024 11:09 AM

## 2025-03-13 ENCOUNTER — HOSPITAL ENCOUNTER (OUTPATIENT)
Dept: RADIOLOGY | Facility: HOSPITAL | Age: 65
Discharge: HOME | End: 2025-03-13
Payer: COMMERCIAL

## 2025-03-13 DIAGNOSIS — R10.84 GENERALIZED ABDOMINAL PAIN: ICD-10-CM

## 2025-03-13 PROCEDURE — 74018 RADEX ABDOMEN 1 VIEW: CPT

## 2025-03-19 ENCOUNTER — TELEPHONE (OUTPATIENT)
Dept: UROLOGY | Facility: CLINIC | Age: 65
End: 2025-03-19
Payer: COMMERCIAL

## 2025-03-19 NOTE — TELEPHONE ENCOUNTER
LVM on nurse line requesting clarification of CT and follow up. Advised virtual visit is scheduled for 4/22/25 to discuss CT results.   CT scheduled for 4/15/25. Reminded of blood work to be done about a week prior to CT. Voiced understanding with no further questions at this time.

## 2025-04-09 LAB
ALBUMIN SERPL-MCNC: 4.5 G/DL (ref 3.6–5.1)
ALP SERPL-CCNC: 64 U/L (ref 35–144)
ALT SERPL-CCNC: 25 U/L (ref 9–46)
ANION GAP SERPL CALCULATED.4IONS-SCNC: 6 MMOL/L (CALC) (ref 7–17)
AST SERPL-CCNC: 37 U/L (ref 10–35)
BILIRUB SERPL-MCNC: 0.3 MG/DL (ref 0.2–1.2)
BUN SERPL-MCNC: 23 MG/DL (ref 7–25)
CALCIUM SERPL-MCNC: 9.8 MG/DL (ref 8.6–10.3)
CHLORIDE SERPL-SCNC: 104 MMOL/L (ref 98–110)
CO2 SERPL-SCNC: 28 MMOL/L (ref 20–32)
CREAT SERPL-MCNC: 1.53 MG/DL (ref 0.7–1.35)
EGFRCR SERPLBLD CKD-EPI 2021: 51 ML/MIN/1.73M2
ERYTHROCYTE [DISTWIDTH] IN BLOOD BY AUTOMATED COUNT: 13.1 % (ref 11–15)
GLUCOSE SERPL-MCNC: 146 MG/DL (ref 65–99)
HCT VFR BLD AUTO: 40.4 % (ref 38.5–50)
HGB BLD-MCNC: 13.4 G/DL (ref 13.2–17.1)
MCH RBC QN AUTO: 30 PG (ref 27–33)
MCHC RBC AUTO-ENTMCNC: 33.2 G/DL (ref 32–36)
MCV RBC AUTO: 90.6 FL (ref 80–100)
PLATELET # BLD AUTO: 330 THOUSAND/UL (ref 140–400)
PMV BLD REES-ECKER: 9.8 FL (ref 7.5–12.5)
POTASSIUM SERPL-SCNC: 4.6 MMOL/L (ref 3.5–5.3)
PROT SERPL-MCNC: 6.8 G/DL (ref 6.1–8.1)
RBC # BLD AUTO: 4.46 MILLION/UL (ref 4.2–5.8)
SODIUM SERPL-SCNC: 138 MMOL/L (ref 135–146)
WBC # BLD AUTO: 9.4 THOUSAND/UL (ref 3.8–10.8)

## 2025-04-15 ENCOUNTER — HOSPITAL ENCOUNTER (OUTPATIENT)
Dept: RADIOLOGY | Facility: HOSPITAL | Age: 65
Discharge: HOME | End: 2025-04-15
Payer: COMMERCIAL

## 2025-04-15 DIAGNOSIS — N28.89 RENAL MASS: ICD-10-CM

## 2025-04-15 PROCEDURE — 2550000001 HC RX 255 CONTRASTS: Performed by: UROLOGY

## 2025-04-15 PROCEDURE — 74170 CT ABD WO CNTRST FLWD CNTRST: CPT

## 2025-04-15 RX ADMIN — IOHEXOL 75 ML: 350 INJECTION, SOLUTION INTRAVENOUS at 10:13

## 2025-04-22 ENCOUNTER — APPOINTMENT (OUTPATIENT)
Dept: UROLOGY | Facility: CLINIC | Age: 65
End: 2025-04-22
Payer: COMMERCIAL

## 2025-04-22 DIAGNOSIS — C64.2 RENAL CELL CANCER, LEFT (MULTI): Primary | ICD-10-CM

## 2025-04-22 DIAGNOSIS — N28.89 RENAL MASS: ICD-10-CM

## 2025-04-22 PROCEDURE — 99213 OFFICE O/P EST LOW 20 MIN: CPT | Performed by: UROLOGY

## 2025-04-22 PROCEDURE — 4010F ACE/ARB THERAPY RXD/TAKEN: CPT | Performed by: UROLOGY

## 2025-04-22 NOTE — PROGRESS NOTES
Subjective   Patient ID: Rm Jara is a 64 y.o. male who presents for CT results. Last seen 10/22/24 when Pathology came back as renal cell carcinoma with clean margins. No further treatment is needed. We provided a note for the patient to return to work on 11/18/24. Order CT renal mass, CBC, and CMP in 6 months and follow-up with results.      Virtual or Telephone Consent    While technically available, the patient was unable or unwilling to consent to connect via audio/video telehealth technology; therefore, I performed this visit using a real-time audio only connection between Rm Jara & Pablo Valdez MD.  Verbal consent was requested and obtained from Rm Jara on this date, 04/22/25 for a telehealth visit and the patient's location was confirmed at the time of the visit.      HPI      CT Kidney w/wo contrast  IMPRESSION:  1. Status post left nephrectomy.  2. Slight increase in size from 1.6 cm non cystic mass lower pole  right kidney suspicious for renal neoplasm.  3. Benign right hepatic hemangioma.  4. Status post cholecystectomy.    CMP Results  Component  Ref Range & Units 2 wk ago   GLUCOSE  65 - 99 mg/dL 146 High    Comment:               Fasting reference interval     For someone without known diabetes, a glucose  value >125 mg/dL indicates that they may have  diabetes and this should be confirmed with a  follow-up test.      UREA NITROGEN (BUN)  7 - 25 mg/dL 23   CREATININE  0.70 - 1.35 mg/dL 1.53 High    EGFR  > OR = 60 mL/min/1.73m2 51 Low    SODIUM  135 - 146 mmol/L 138   POTASSIUM  3.5 - 5.3 mmol/L 4.6   CHLORIDE  98 - 110 mmol/L 104   CARBON DIOXIDE  20 - 32 mmol/L 28   ELECTROLYTE BALANCE  7 - 17 mmol/L (calc) 6 Low    CALCIUM  8.6 - 10.3 mg/dL 9.8   PROTEIN, TOTAL  6.1 - 8.1 g/dL 6.8   ALBUMIN  3.6 - 5.1 g/dL 4.5   BILIRUBIN, TOTAL  0.2 - 1.2 mg/dL 0.3   ALKALINE PHOSPHATASE  35 - 144 U/L 64   AST  10 - 35 U/L 37 High    ALT  9 - 46 U/L 25     CBC Results  Component  Ref  Range & Units 2 wk ago   WHITE BLOOD CELL COUNT  3.8 - 10.8 Thousand/uL 9.4   RED BLOOD CELL COUNT  4.20 - 5.80 Million/uL 4.46   HEMOGLOBIN  13.2 - 17.1 g/dL 13.4   HEMATOCRIT  38.5 - 50.0 % 40.4   MCV  80.0 - 100.0 fL 90.6   MCH  27.0 - 33.0 pg 30.0   MCHC  32.0 - 36.0 g/dL 33.2   Comment: For adults, a slight decrease in the calculated MCHC  value (in the range of 30 to 32 g/dL) is most likely  not clinically significant; however, it should be  interpreted with caution in correlation with other  red cell parameters and the patient's clinical  condition.   RDW  11.0 - 15.0 % 13.1   PLATELET COUNT  140 - 400 Thousand/uL 330   MPV  7.5 - 12.5 fL 9.8     Review of Systems  A 12 system review was completed and is negative with the exception of those signs and symptoms noted in the history of present illness.    Objective   Physical Exam  General: in NAD, appears stated age  Head: normocephalic, atraumatic  Respiratory: normal effort, no use of accessory muscles  Cardiovascular: no edema noted  Skin: normal turgor, no rashes  Neurologic: grossly intact, oriented to person/place/time  Psychiatric: mode and affect appropriate     Assessment/Plan     We discussed the CT findings where there is a small enhancing mass that looks different from the MRI that was done last fall. Since the patient has only 1 kidney, I would like to follow-up with an MRI Kidney so it can be compared with prior MRI imaging. The patient will follow-up with results with one of my colleagues. Order MRI Kidney w/wo contrast and follow-up with Dr. Prabhakar Valle.       [place] Attestation  By signing my name below, I, Edie Evans   attest that this documentation has been prepared under the direction and in the presence of Pablo Valdez MD.

## 2025-05-05 ENCOUNTER — TELEPHONE (OUTPATIENT)
Dept: UROLOGY | Facility: CLINIC | Age: 65
End: 2025-05-05
Payer: COMMERCIAL

## 2025-05-05 NOTE — TELEPHONE ENCOUNTER
Pt called to schedule follow up for MRI results with Dr. Valle. Scheduling number provided. Pt also has an appointment scheduled with urology PA on 5/13/25.

## 2025-05-06 ENCOUNTER — HOSPITAL ENCOUNTER (OUTPATIENT)
Dept: RADIOLOGY | Facility: HOSPITAL | Age: 65
Discharge: HOME | End: 2025-05-06
Payer: COMMERCIAL

## 2025-05-06 DIAGNOSIS — N28.89 RENAL MASS: ICD-10-CM

## 2025-05-06 DIAGNOSIS — C64.2 RENAL CELL CANCER, LEFT (MULTI): ICD-10-CM

## 2025-05-06 PROCEDURE — 74183 MRI ABD W/O CNTR FLWD CNTR: CPT

## 2025-05-06 PROCEDURE — 2550000001 HC RX 255 CONTRASTS: Mod: JW

## 2025-05-06 PROCEDURE — A9575 INJ GADOTERATE MEGLUMI 0.1ML: HCPCS | Mod: JW

## 2025-05-06 RX ORDER — GADOTERATE MEGLUMINE 376.9 MG/ML
16 INJECTION INTRAVENOUS
Status: COMPLETED | OUTPATIENT
Start: 2025-05-06 | End: 2025-05-06

## 2025-05-06 RX ADMIN — GADOTERATE MEGLUMINE 16 ML: 376.9 INJECTION INTRAVENOUS at 21:12

## 2025-05-13 ENCOUNTER — APPOINTMENT (OUTPATIENT)
Dept: UROLOGY | Facility: CLINIC | Age: 65
End: 2025-05-13
Payer: COMMERCIAL

## 2025-05-13 VITALS
HEIGHT: 70 IN | SYSTOLIC BLOOD PRESSURE: 127 MMHG | BODY MASS INDEX: 28.06 KG/M2 | HEART RATE: 87 BPM | DIASTOLIC BLOOD PRESSURE: 76 MMHG | WEIGHT: 195.99 LBS

## 2025-05-13 DIAGNOSIS — C64.2 RENAL CELL CANCER, LEFT (MULTI): Primary | ICD-10-CM

## 2025-05-13 DIAGNOSIS — N28.89 MASS OF RIGHT KIDNEY: ICD-10-CM

## 2025-05-13 PROCEDURE — 99215 OFFICE O/P EST HI 40 MIN: CPT

## 2025-05-13 PROCEDURE — 4010F ACE/ARB THERAPY RXD/TAKEN: CPT

## 2025-05-13 PROCEDURE — 3008F BODY MASS INDEX DOCD: CPT

## 2025-05-13 PROCEDURE — 3078F DIAST BP <80 MM HG: CPT

## 2025-05-13 PROCEDURE — 3074F SYST BP LT 130 MM HG: CPT

## 2025-05-13 RX ORDER — PRAVASTATIN SODIUM 20 MG/1
1 TABLET ORAL
COMMUNITY
Start: 2025-02-26

## 2025-05-13 RX ORDER — ONDANSETRON 4 MG/1
10 TABLET, ORALLY DISINTEGRATING ORAL 3 TIMES DAILY PRN
COMMUNITY
Start: 2025-03-12

## 2025-05-13 RX ORDER — ACARBOSE 50 MG/1
TABLET ORAL
COMMUNITY
Start: 2025-05-12

## 2025-05-13 RX ORDER — METRONIDAZOLE 500 MG/1
500 TABLET ORAL 2 TIMES DAILY
COMMUNITY
Start: 2025-03-12

## 2025-05-13 NOTE — PROGRESS NOTES
Subjective   Patient ID: Rm Jara is a 64 y.o. male.    HPI  Patient presents with wife for MRI results.  Hx of renal cancer s/p laparoscopic left radical nephrectomy with Dr. Valdez on 10/07/2024.  Path = RCC with clean margins, no further tx advised after surgery.  He had fu last month after CT of kidney.  This showed slight increase in size from 1.6 cm non-cystic mass LP right kidney suspicious for renal neoplasm.  Creatinine 1.53, WBC 9.4.  Dr. Valdez recommended MRI imaging to further evaluate and compare to prior MRI.      MRI with right renal LP 1.5 cm subtle solid mass highly concerning for small renal neoplasm.    No pain.  No gross hematuria.  No urinary sxs.  Voids frequently but attributes to being a diabetic.  Patient has been vomiting intermittent over the last fews months.  Last time was 2 wks ago.  Doesn't have any association with food intake.  Suddenly feels queezy and then gets sick.  Association of diarrhea.  Two normal cologuard in the last 6 mo.  Colonoscopy yrs ago.  Never had an EGD.  PCP ordered XR of abd which was normal.    Review of Systems  See HPI.    Objective   Physical Exam  Vitals:    05/13/25 1021   BP: 127/76   Pulse: 87     Alert and oriented x3  No acute distress, cooperative  Breathes easily on room air  Normal range of motion  No focal neurological deficits  Appears stated age    Lab Results   Component Value Date    GLUCOSE 146 (H) 04/08/2025    CALCIUM 9.8 04/08/2025     04/08/2025    K 4.6 04/08/2025    CO2 28 04/08/2025     04/08/2025    BUN 23 04/08/2025    CREATININE 1.53 (H) 04/08/2025     Lab Results   Component Value Date    WBC 9.4 04/08/2025    HGB 13.4 04/08/2025    HCT 40.4 04/08/2025    MCV 90.6 04/08/2025     04/08/2025     === 05/06/25 ===    MR KIDNEY W AND WO IV CONTRAST    - Impression -  1.  Right renal lower pole 1.5 cm subtle solid enhancing mass highly  concerning for small renal neoplasm. Mass is more clearly  delineated  on the recent CT kidney of 04/15/2025.  2. Right renal anterior interpolar ovoid 1.8 cm T1 hyperintense  nonenhancing cortical lesion compatible with a complicated cyst  containing debris or blood product.  3. Right renal medial interpolar 1 cm nonenhancing simple cyst.  4. Previous left nephrectomy. No abnormal enhancement within the  nephrectomy bed region.  5. Right hepatic lobe posterior segment 3.5 cm lobulated hemangioma  similar to prior.      MACRO:  None    Signed by: Arthur Ellis 5/8/2025 8:51 AM  Dictation workstation:   [id number]    Assessment/Plan   Diagnoses and all orders for this visit:  Renal cell cancer, left (Multi)  -     Referral to Urology; Future  Mass of right kidney  -     Referral to Urology; Future    Patient with hx of RCC s/p left radical nephrectomy presents for MRI results for small right renal lesion. MRI with 1.5 cm solid enhancing mass concerning for renal neoplasm.  Imaging reviewed by Dr. Villalta and discussed, recommends active surveillance with repeat MRI in 6 mo vs. cryotherapy or partial nephrectomy.  Options reviewed in detail with patient and his wife.  Patient is leaning towards surgery, he would like to establish with Dr. Villalta.  Will get him an appt for surgery discussion.  Patient agrees with plan and all questions answered.    PLAN:  Fu with Dr. Pawan Carrillo PA-C 05/13/25 10:18 AM

## 2025-05-13 NOTE — LETTER
May 13, 2025     Patient: Rm Jara   YOB: 1960   Date of Visit: 5/13/2025       To Whom It May Concern:    Rm Jara was seen in my clinic on 5/13/2025 at 10:20 am. Please excuse Rm for his absence from work on this day to make the appointment.    If you have any questions or concerns, please don't hesitate to call.         Sincerely,         Hope Carrillo PA-C        CC: No Recipients

## 2025-07-07 ENCOUNTER — APPOINTMENT (OUTPATIENT)
Dept: UROLOGY | Facility: CLINIC | Age: 65
End: 2025-07-07
Payer: COMMERCIAL

## 2025-07-07 VITALS — TEMPERATURE: 96.9 F | DIASTOLIC BLOOD PRESSURE: 73 MMHG | SYSTOLIC BLOOD PRESSURE: 157 MMHG | HEART RATE: 73 BPM

## 2025-07-07 DIAGNOSIS — N28.89 MASS OF RIGHT KIDNEY: ICD-10-CM

## 2025-07-07 DIAGNOSIS — C64.2 RENAL CELL CANCER, LEFT (MULTI): ICD-10-CM

## 2025-07-07 DIAGNOSIS — N18.31 CKD STAGE 3A, GFR 45-59 ML/MIN (MULTI): Primary | ICD-10-CM

## 2025-07-07 PROCEDURE — 3078F DIAST BP <80 MM HG: CPT | Performed by: UROLOGY

## 2025-07-07 PROCEDURE — 1036F TOBACCO NON-USER: CPT | Performed by: UROLOGY

## 2025-07-07 PROCEDURE — 4010F ACE/ARB THERAPY RXD/TAKEN: CPT | Performed by: UROLOGY

## 2025-07-07 PROCEDURE — 3077F SYST BP >= 140 MM HG: CPT | Performed by: UROLOGY

## 2025-07-07 PROCEDURE — 99215 OFFICE O/P EST HI 40 MIN: CPT | Performed by: UROLOGY

## 2025-07-07 NOTE — LETTER
July 7, 2025     Patient: Rm Jara   YOB: 1960   Date of Visit: 7/7/2025       To Whom It May Concern:    Rm Jara was seen in my clinic on 7/7/2025 at 9:00 am. Please excuse Rm for his absence from work on this day to make the appointment.    If you have any questions or concerns, please don't hesitate to call.         Sincerely,         Bradford Villalta MD        CC: No Recipients

## 2025-07-07 NOTE — PROGRESS NOTES
PAST UROLOGICAL HISTORY:  This is a 64-year-old man with a history of left renal cell carcinoma. He underwent a left laparoscopic radical nephrectomy on 10/07/2024. Pathology showed a 5 cm, grade 3, clear cell RCC with negative margins. Labs from 04/08/2025 showed a Cr of 1.53 and GFR of 51. An MRI on 05/06/2025 noted a possible 1.5 cm renal mass and a complicated cyst in the remaining right kidney.    I have reviewed the notes from Dr. Valdez and the MRI report from 05/06/2025 as noted herein.    HPI TODAY 07/07/2025:  Renal Cell Carcinoma:  - Mr. Jara follows up for surveillance after left radical nephrectomy. He is now under my care as Dr. Valdez is leaving the practice. A recent MRI showed a 1.5 cm nodule on the right kidney, which appears stable in size compared to an image from 08/2024.  - He is asymptomatic from a urologic standpoint. Recovers well from his surgery, though reports some occasional discomfort in the upper left quadrant near the incision, which wakes him at times.    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 He returned to work about 4 weeks post-op and noted it was difficult for the first 3 months.    REVIEW OF SYSTEMS: A tailored review of systems was performed and all pertinent positives and negatives are listed in the HPI.    PHYSICAL EXAMINATION:  Gen: NAD  Pulm: No increased WOB on RA  Cards: WWP  Abdomen: Soft, non-tender. Mild tenderness to palpation in the LUQ, just under the ribs.    ASSESSMENT/PLAN:  Right Renal Mass (N28.1)  - Assessment: Small, 1.5 cm right renal mass, possibly a small RCC or benign entity. Stable in size compared to 08/2024. Given his solitary kidney, the goal is renal preservation.  - Plan:    - MRI kidney w/ and w/o contrast in 6 months.    - Complete metabolic panel in 6 months.    - Follow-up in 6 months to review results.  - Counseling: Discussed the nature of  the small renal mass. Explained that it does not require immediate treatment. Surveillance with imaging is the current plan. If it grows, treatment options were discussed. A partial nephrectomy would be a primary surgical option, aiming to preserve most of the kidney. Cryoablation is less favored due to the proximity of the mass to the colon. Risks, benefits, and alternatives were discussed including but not limited to bleeding, infection, and injury to surrounding organs like the colon. Emphasized that we are optimistic we can avoid dialysis or transplant.    History of Left RCC (Z85.528)  - Assessment: s/p left radical nephrectomy for RCC. Surgical bed on the left appears clear on recent imaging. No evidence of recurrence.  - Plan:    - Continue surveillance as above.    CKD Stage 3a (N18.31)  - Assessment: Stable CKD, likely secondary to solitary kidney.  - Plan:    - Continue to monitor renal function.  - Counseling: Stressed the importance of protecting his remaining kidney. This includes strict management of his diabetes and hypertension, which are the leading causes of kidney dysfunction.    Diabetes Mellitus, Type 2 (E11.9) / Hypertension (I10)  - Assessment: Reports improved glycemic control and BP management. His last A1C in 09/2024 was elevated at 8.8, but he has been working on it.  - Plan:    - Continue management with his primary provider.    - Emphasized the critical role of tight control in preserving his renal function.

## 2025-10-13 ENCOUNTER — APPOINTMENT (OUTPATIENT)
Dept: UROLOGY | Facility: CLINIC | Age: 65
End: 2025-10-13
Payer: COMMERCIAL

## 2026-01-12 ENCOUNTER — APPOINTMENT (OUTPATIENT)
Dept: UROLOGY | Facility: CLINIC | Age: 66
End: 2026-01-12
Payer: COMMERCIAL

## (undated) DEVICE — GLOVE, SURGICAL, PROTEXIS PI MICRO, 7.5, PF, LF

## (undated) DEVICE — C-ARM: Brand: UNBRANDED

## (undated) DEVICE — TISSUE RETRIEVAL SYSTEM: Brand: INZII RETRIEVAL SYSTEM

## (undated) DEVICE — TROCAR, OPTICAL, BLADELESS, 12MM, THREADED, 100MM LENGTH

## (undated) DEVICE — LAPAROSCOPIC TROCAR SLEEVE/SINGLE USE: Brand: KII® OPTICAL ACCESS SYSTEM

## (undated) DEVICE — SHEARS, CURVED HARMONIC ACE 36CM

## (undated) DEVICE — APPLIER CLP M L L11.4IN DIA10MM ENDOSCP ROT MULT FOR LIG

## (undated) DEVICE — SUTURE MONOCRYL SZ 4-0 L27IN ABSRB UD L19MM PS-2 1/2 CIR PRIM Y426H

## (undated) DEVICE — CATHETER IV 12GA L76MM EXTN DIA2.8MM FEP POLYMER THRM

## (undated) DEVICE — SUTURE, VICRYL, 3-0, 27 IN, SH

## (undated) DEVICE — STAPLER, ECHELON 3000, 45MM STD

## (undated) DEVICE — NEPTUNE E-SEP SMOKE EVACUATION PENCIL, COATED, 70MM BLADE, PUSH BUTTON SWITCH: Brand: NEPTUNE E-SEP

## (undated) DEVICE — STRIP,CLOSURE,WOUND,MEDI-STRIP,1/2X4: Brand: MEDLINE

## (undated) DEVICE — SUTURE VICRYL + SZ 0 L27IN ABSRB VLT L26MM UR-6 5/8 CIR VCP603H

## (undated) DEVICE — BANDAGE ADH W2XL4IN NITRL FAB STRP CURAD

## (undated) DEVICE — ELECTRODE LAP L36CM PTFE WIRE J HK CLEANCOAT

## (undated) DEVICE — SUTURE, VICRYL, 18 IN, CT-1, UNDYED

## (undated) DEVICE — CARE KIT, LAPAROSCOPIC, ADVANCED

## (undated) DEVICE — GENERAL LAPAROSCOPY: Brand: MEDLINE INDUSTRIES, INC.

## (undated) DEVICE — SOLUTION, IRRIGATION, SODIUM CHLORIDE 0.9%, 1000 ML, POUR BOTTLE

## (undated) DEVICE — SUTURE, PDS II, 1, 36 IN, CT-1, VIOLET

## (undated) DEVICE — TROCAR: Brand: KII FIOS FIRST ENTRY

## (undated) DEVICE — STERILE COTTON BALLS LARGE 5/P: Brand: MEDLINE

## (undated) DEVICE — TOWEL PACK, STERILE, 4/PACK, BLUE

## (undated) DEVICE — TROCAR SYSTEM, BALLOON, KII GELPORT, 12 X 100MM

## (undated) DEVICE — GLOVE ORANGE PI 7 1/2   MSG9075

## (undated) DEVICE — BANDAGE ADH W0.75XL3IN UNIV WVN FAB NAT GEN USE STRP N ADH

## (undated) DEVICE — SUTURE, VICRYL, 4-0, 18 IN, UNDYED BR PS-2

## (undated) DEVICE — STAPLER, ENDO ECHELON 45MM RELOAD, WHITE, REUSABLE

## (undated) DEVICE — Device

## (undated) DEVICE — ADHESIVE, SKIN, DERMABOND ADVANCED, 15CM, PEN-STYLE

## (undated) DEVICE — APPLICATOR, CHLORAPREP, W/ORANGE TINT, 26ML

## (undated) DEVICE — SYRINGE MED 30ML STD CLR PLAS LUERLOCK TIP N CTRL DISP

## (undated) DEVICE — STOPCOCK 3-WAY 45 PSI LOW OFF ROT COLAR

## (undated) DEVICE — SUTURE, VICRYL, 0, 27 IN, UR-6, VIOLET

## (undated) DEVICE — TUBE SET, PNEUMOLAR HEATED, SMOKE EVACU, HIGH-FLOW

## (undated) DEVICE — TROCAR: Brand: KII® SLEEVE

## (undated) DEVICE — SOLUTION, IRRIGATION, STERILE WATER, 1000 ML, POUR BOTTLE

## (undated) DEVICE — SCISSORS, METZ, CURVED, 3/4 BLADE

## (undated) DEVICE — OPEN-END URETERAL CATHETER: Brand: COOK

## (undated) DEVICE — TRAY, SURESTEP, SILICONE DRAINAGE BAG, STATLOCK, 16FR